# Patient Record
Sex: FEMALE | Race: WHITE
[De-identification: names, ages, dates, MRNs, and addresses within clinical notes are randomized per-mention and may not be internally consistent; named-entity substitution may affect disease eponyms.]

---

## 2019-03-03 NOTE — ED COUGH/URI
General


Chief Complaint:  Respiratory Problems


Stated Complaint:  COUGH-HARD TO BREATHE


Nursing Triage Note:  


COUGH X3 DAYS STARTING TO WHEEZE LAST NIGHT.


Sepsis Screen:  No Definite Risk





History of Present Illness


Date Seen by Provider:  Mar 3, 2019


Time Seen by Provider:  00:59


Initial Comments


Patient is a 19-year-old female who presents to the emergency department today 

complaining of loss of voice and a cough. She has been ill over the last 2-3 

days. She complains of a persistent dry hacking cough. She does have a prior 

history of asthma and has been using her inhaler but this has not been 

relieving her cough symptoms. No fevers or chills. No upper airway congestion. 

She also lost her voice and complains of hoarseness. No swelling in the back of 

the throat. Mild sore throat but this is not a predominant symptom.





Allergies and Home Medications


Allergies


Coded Allergies:  


     strawberry (Unverified  Allergy, Mild, 3/3/19)


     latex (Verified  Allergy, Unknown, RASH, 3/3/19)





Home Medications


Azithromycin 250 Mg Tablet, 250 MG PO DAILY


   Prescribed by: MADDIE PETER on 3/3/19 0105


Hydrocodone/Acetaminophen 10 Ml Solution, 5 ML PO Q4H PRN for COUGH


   Prescribed by: MADDIE PETER on 3/3/19 0106


Oseltamivir Phosphate 75 Mg Capsule, 1 CAP PO BID


   Prescribed by: ROBER BRAND on 8/25/09 0112


Prednisone 50 Mg Tab, 50 MG PO DAILY


   Prescribed by: MADDIE PETER on 3/3/19 0105





Patient Home Medication List


Home Medication List Reviewed:  Yes





Review of Systems


Review of Systems


Constitutional:  see HPI


EENTM:  see HPI


Respiratory:  see HPI


Cardiovascular:  no symptoms reported


Genitourinary:  no symptoms reported


Musculoskeletal:  no symptoms reported


Skin:  no symptoms reported


Hematologic/Lymphatic:  No Symptoms Reported





All Other Systems Reviewed


Negative Unless Noted:  Yes





Past Medical-Social-Family Hx


Patient Social History


Recent Foreign Travel:  No


Contact w/Someone Who Travel:  No


Recent Infectious Disease Expo:  No


Physical Abuse:  No


Sexual Abuse:  No


Mistreated:  No


Fear:  No





Past Medical History


Pregnant:  No





Physical Exam





Vital Signs - First Documented








 3/3/19





 00:50


 


Temp 98.0


 


Pulse 96


 


Resp 20


 


B/P (MAP) 129/79 (96)


 


Pulse Ox 100


 


O2 Delivery Room Air





Capillary Refill : Less Than 3 Seconds


Height: 5'5.50"


Weight: 350lbs. oz. 158.178140ny;  BMI


Method:Actual


General Appearance:  WD/WN, no apparent distress


HEENT:  PERRL/EOMI, normal ENT inspection, TMs normal, pharynx normal


Neck:  full range of motion, supple


Respiratory:  chest non-tender, lungs clear, normal breath sounds, no 

respiratory distress


Cardiovascular:  regular rate, rhythm


Extremities:  normal range of motion


Neurologic/Psychiatric:  CNs II-XII nml as tested


Skin:  normal color





Progress/Results/Core Measures


Suspected Sepsis


Recent Fever Within 48 Hours:  No


Infection Criteria Present:  None


New/Unexplained  Altered Menta:  No


Sepsis Screen:  No Definite Risk


SIRS


Temperature:98.0 


Pulse: 96 


Respiratory Rate: 20


 


Blood Pressure 129 /79 


Mean: 96





Results/Orders


My Orders





Orders - MADDIE PETER DO


Azithromycin Tablet (Zithromax Tablet) (3/3/19 01:00)


Prednisone Tablet (Deltasone Tablet) (3/3/19 01:00)


Hydrocodone/Apap 5/325 Tablet (Lortab 5 (3/3/19 01:00)





Medications Given in ED





Current Medications








 Medications  Dose


 Ordered  Sig/Veronica


 Route  Start Time


 Stop Time Status Last Admin


Dose Admin


 


 Acetaminophen/


 Hydrocodone Bitart  1 tab  ONCE  ONCE


 PO  3/3/19 01:00


 3/3/19 01:01 DC 3/3/19 01:13


1 TAB


 


 Azithromycin  500 mg  ONCE  ONCE


 PO  3/3/19 01:00


 3/3/19 01:01 DC 3/3/19 01:13


500 MG


 


 Prednisone  50 mg  ONCE  ONCE


 PO  3/3/19 01:00


 3/3/19 01:01 DC 3/3/19 01:13


50 MG








Vital Signs/I&O











 3/3/19





 00:50


 


Temp 98.0


 


Pulse 96


 


Resp 20


 


B/P (MAP) 129/79 (96)


 


Pulse Ox 100


 


O2 Delivery Room Air





Capillary Refill : Less Than 3 Seconds








Blood Pressure Mean:  96








Progress Note :  


   Time:  01:00


Progress Note


Patient was evaluated in the emergency department for a cough. She did have 

very tight sounding cough when she was coughing. She was not producing sputum. 

No fevers or chills. The respiratory examination was negative. She does have a 

history of asthma but her lungs were perfectly clear with good air movement and 

no increased work of breathing. In the ER, patient was given the first dose of 

a Z-Bhavin, prednisone, and one Norco. She was discharged home with some 

hydrocodone elixir to use at home for her cough. She will complete the Z-Bhavin 

over the next 4 days and is placed on prednisone for 4 days as well. She does 

have an albuterol inhaler at home which she states she has plenty of and does 

not need a refill. She was encouraged to follow up with her primary care doctor 

or come back to the ER for any new or worsening symptoms.





Departure


Impression





 Primary Impression:  


 Bronchitis


 Additional Impression:  


 Laryngitis


Disposition:  01 HOME, SELF-CARE


Condition:  Improved





Departure-Patient Inst.


Referrals:  


NO,LOCAL PHYSICIAN (PCP)


Primary Care Physician


Scripts


Hydrocodone/Acetaminophen (Hydrocodone-Acetamin 5-217/10 ML) 10 Ml Solution


5 ML PO Q4H PRN for COUGH, #120 ML


   Prov: MADIDE PETER DO         3/3/19 


Azithromycin (Azithromycin) 250 Mg Tablet


250 MG PO DAILY, #4 TAB 0 Refills


   Prov: MADDIE PETER DO         3/3/19 


Prednisone (Prednisone) 50 Mg Tab


50 MG PO DAILY for 4 Days, #4 TAB


   Prov: MADDIE PETER DO         3/3/19











MADDIE PETER DO Mar 3, 2019 00:59

## 2019-07-18 NOTE — ED GENERAL
General


Chief Complaint:  Abdominal/GI Problems


Stated Complaint:  VOMITING, FATIGUED


Source of Information:  Patient, Family (father)





History of Present Illness


Date Seen by Provider:  Jul 18, 2019


Time Seen by Provider:  23:20


Initial Comments


The patient is a morbidly obese pleasant 19-year-old female who presents for 

evaluation of nausea and vomiting over the last 3 or 4 days. She states that her

last menstrual period was on June 1 making her approximately 17 days late. She 

states there is a chance that she is pregnant. She denies any diarrhea, 

abdominal or back pain, pelvic pain/bleeding/discharge, urinary symptoms, 

hematemesis, rectal bleeding, chest pain or shortness of breath, dizziness or 

syncope. She is alert and oriented 4, calm, appears to be in no distress. She 

has not been pregnant previously.


Timing/Duration:  3-4 Days


Severity:  Mild


Associated Systoms:  Nausea/Vomiting





Allergies and Home Medications


Allergies


Coded Allergies:  


     strawberry (Unverified  Allergy, Mild, 3/3/19)


     latex (Verified  Allergy, Unknown, RASH, 3/3/19)





Home Medications


Azithromycin 250 Mg Tablet, 250 MG PO DAILY


   Prescribed by: MADDIE PETER on 3/3/19 0105


Hydrocodone/Acetaminophen 10 Ml Solution, 5 ML PO Q4H PRN for COUGH


   Prescribed by: MADDIE PETER on 3/3/19 0106


Oseltamivir Phosphate 75 Mg Capsule, 1 CAP PO BID


   Prescribed by: ROBER BRAND on 8/25/09 0112


Prednisone 50 Mg Tab, 50 MG PO DAILY


   Prescribed by: MADDIE PETER on 3/3/19 0105





Patient Home Medication List


Home Medication List Reviewed:  Yes





Review of Systems


Review of Systems


Constitutional:  no symptoms reported


EENTM:  no symptoms reported


Respiratory:  no symptoms reported


Cardiovascular:  no symptoms reported


Gastrointestinal:  nausea, vomiting


Genitourinary:  no symptoms reported


Musculoskeletal:  no symptoms reported


Skin:  no symptoms reported


Psychiatric/Neurological:  No Symptoms Reported


Hematologic/Lymphatic:  No Symptoms Reported


Immunological/Allergic:  no symptoms reported





All Other Systems Reviewed


Negative Unless Noted:  Yes





Past Medical-Social-Family Hx


Past Med/Social Hx:  Reviewed Nursing Past Med/Soc Hx


Patient Social History


2nd Hand Smoke Exposure:  No


Recent Foreign Travel:  No


Contact w/Someone Who Travel:  No


Recent Hopitalizations:  No





Immunizations Up To Date


Tetanus Booster (TDap):  Unknown





Past Medical History


Surgeries:  Yes (WISDOM TEETH REMOVAL)


Gallbladder


Respiratory:  Yes


Asthma


Cardiac:  No


Neurological:  No


Genitourinary:  No


Gastrointestinal:  No


Musculoskeletal:  No


Endocrine:  No


HEENT:  No


Cancer:  No


Psychosocial:  No


Blood Disorders:  No





Physical Exam


Vital Signs





Vital Signs - First Documented








 7/18/19





 23:13


 


Temp 97.6


 


Pulse 88


 


Resp 18


 


B/P (MAP) 142/78 (99)


 


Pulse Ox 99


 


O2 Delivery Room Air





Capillary Refill :


Height, Weight, BMI


Height: 5'5.50"


Weight: 350lbs. oz. 158.299653sr;  BMI


Method:Actual


General Appearance:  No Apparent Distress, WD/WN, Obese


HEENT:  PERRL/EOMI, Pharynx Normal


Neck:  Full Range of Motion, Normal Inspection


Respiratory:  Lungs Clear, Normal Breath Sounds, No Respiratory Distress


Cardiovascular:  Regular Rate, Rhythm, No Murmur, Normal Peripheral Pulses


Gastrointestinal:  Normal Bowel Sounds, No Organomegaly, No Pulsatile Mass, Non 

Tender, Soft


Extremity:  Normal Capillary Refill, Normal Inspection, Normal Range of Motion, 

Non Tender


Neurologic/Psychiatric:  Alert, Oriented x3, No Motor/Sensory Deficits, Normal 

Mood/Affect, CNs II-XII Norm as Tested


Skin:  Normal Color, Warm/Dry





Progress/Results/Core Measures


Suspected Sepsis


SIRS


Temperature: 


Pulse:  


Respiratory Rate: 


 


Blood Pressure  / 


Mean:





Results/Orders


Lab Results





Laboratory Tests








Test


 7/18/19


23:47 Range/Units


 


 


Urine Color YELLOW   


 


Urine Clarity SLT CLOUDY   


 


Urine pH 6.0  5-9  


 


Urine Specific Gravity 1.020  1.016-1.022  


 


Urine Protein NEGATIVE  NEGATIVE  


 


Urine Glucose (UA) NEGATIVE  NEGATIVE  


 


Urine Ketones NEGATIVE  NEGATIVE  


 


Urine Nitrite NEGATIVE  NEGATIVE  


 


Urine Bilirubin NEGATIVE  NEGATIVE  


 


Urine Urobilinogen 1.0  NORMAL  MG/DL


 


Urine Leukocyte Esterase 3+ H NEGATIVE  


 


Urine RBC (Auto) TRACE H NEGATIVE  


 


Urine RBC 0-2   /HPF


 


Urine WBC  H  /HPF


 


Urine Squamous Epithelial


Cells 2-5 


  /HPF





 


Urine Crystals NONE   /LPF


 


Urine Bacteria MODERATE H  /HPF


 


Urine Casts NONE   /LPF


 


Urine Mucus NONE   /LPF


 


Urine Trichomonas FEW H  /HPF


 


Urine Culture Indicated YES   


 


Urine Pregnancy Test NEGATIVE  NEGATIVE  








My Orders





Orders - LINDA PRITCHETT DO


Ua Culture If Indicated (7/18/19 23:14)


Hcg,Qualitative Urine (7/18/19 23:14)


Metoclopramide Tablet (Reglan Tablet) (7/18/19 23:30)


Urine Culture (7/18/19 23:47)





Vital Signs/I&O











 7/18/19





 23:13


 


Temp 97.6


 


Pulse 88


 


Resp 18


 


B/P (MAP) 142/78 (99)


 


Pulse Ox 99


 


O2 Delivery Room Air





Capillary Refill :


Progress Note :  


Progress Note


@0014 - Patient updated on urinalysis results which show an acute UTI as well as

Trichomonas. The patient will be sent home with a prescription for Bactrim and 

will be given 2 g of Flagyl here. Advised the patient to follow up with her PCP 

in the next 2-3 days and to return for new or worsening symptoms. The patient 

expresses verbal understanding and agreement with the plan and is stable for 

discharge.





Departure


Impression





   Primary Impression:  


   Acute UTI


   Additional Impression:  


   Trichomonas infection


Disposition:  01 HOME, SELF-CARE


Condition:  Stable





Departure-Patient Inst.


Referrals:  


CHIDI KUMAR MD (PCP)


Primary Care Physician


Patient Instructions:  Urinary Tract Infections in Adults, Trichomoniasis (DC), 

STD Prevention





Add. Discharge Instructions:  


Take the prescribed medicine as directed. Return to the ER for new or worsening 

symptoms. Follow-up with your doctor in the next 2-3 days.


Scripts


Sulfamethoxazole/Trimethoprim (Bactrim Ds Tablet) 1 Each Tablet


1 EACH PO BID for 3 Days, TAB


   Prov: LINDA PRITCHETT DO         7/19/19











LINDA PRITCHETT DO              Jul 18, 2019 23:37

## 2019-07-19 NOTE — XMS REPORT
Continuity of Care Document

                             Created on: 2019



JASON MCDOWELL

External Reference #: [id number]

: 2000

Sex: Female



Demographics







                          Address                   [address] [place], [place] [address]

[place]  [address]

 

                          Home Phone                [phone] x

 

                          Preferred Language        Unknown

 

                          Marital Status            Unknown

 

                          Taoism Affiliation     Unknown

 

                          Race                      Unknown

 

                          Ethnic Group              Unknown





Author







                          Organization              Unknown

 

                          Address                   Unknown

 

                          Phone                     [phone]



              



Allergies

      





             Active              Description              Code              Type              Severity

                Reaction              Onset              Reported/Identified              Relationship

 to Patient                             Clinical Status        

 

             Yes              latex              [id number]              Drug Allergy              Unknown

              RASH                             2019                                  

    

 

                Yes              strawberry              [id number]              Drug Allergy         

             Mild              N/A                             2019                          

                                                 



                    



Medications

      



There is no data.                  



Problems

      





             Date Dx Coded              Attending              Type              Code              Diagnosis

                                        Diagnosed By        

 

                2019              MADDIE PETER DO              Ot              J04.0        

                          ACUTE LARYNGITIS                       

 

                2019              MADDIE PETER DO L              Ot              J45.909      

                          UNSPECIFIED ASTHMA, UNCOMPLICATED                       

 

                2019              PETER DO MADDIE L              Ot              R05          

                          COUGH                              

 

                2019              MADDIE PETER DO L              Ot              Z79.52       

                          LONG TERM (CURRENT) USE OF SYSTEMIC STER                       

 

                2019              BRITTANI DO MADDIE L              Ot              Z91.040      

                          LATEX ALLERGY STATUS                       

 

                2019              PETER DO MADDIE L              Ot              J04.0        

                          ACUTE LARYNGITIS                       

 

                2019              BRITTANI DO MADDIE L              Ot              J45.909      

                          UNSPECIFIED ASTHMA, UNCOMPLICATED                       

 

                2019              PETER DO MADDIE L              Ot              R05          

                          COUGH                              

 

                2019              PETER DO MADDIE L              Ot              Z79.52       

                          LONG TERM (CURRENT) USE OF SYSTEMIC STER                       

 

                2019              PETER DO MADDIE L              Ot              Z91.040      

                          LATEX ALLERGY STATUS                       

 

                2019              PETER DO MADDIE L              Ot              J04.0        

                          ACUTE LARYNGITIS                       

 

                2019              PETER DO MADDIE L              Ot              J45.909      

                          UNSPECIFIED ASTHMA, UNCOMPLICATED                       

 

                2019              PETER DO MADDIE L              Ot              R05          

                          COUGH                              

 

                2019              PETER DO MADDIE L              Ot              Z79.52       

                          LONG TERM (CURRENT) USE OF SYSTEMIC STER                       

 

                2019              PETER DO MADDIE L              Ot              Z91.040      

                          LATEX ALLERGY STATUS                       

 

                2019              PETER DO MADDIE L              Ot              J04.0        

                          ACUTE LARYNGITIS                       

 

                2019              PETER DO MADDIE L              Ot              J45.909      

                          UNSPECIFIED ASTHMA, UNCOMPLICATED                       

 

                2019              MADDIE PETER DO              Ot              R05          

                          COUGH                              

 

                2019              MADDIE PETER DO              Ot              Z79.52       

                          LONG TERM (CURRENT) USE OF SYSTEMIC STER                       

 

                2019              MADDIE PETER DO              Ot              Z91.040      

                          LATEX ALLERGY STATUS                       



                                                        



Procedures

      



There is no data.                  



Results

      





                    Test                Result              Range        









                                        Urine beta human chorionic gonadotropin (hCG) measurement - 19 23:47      

   









                          Urine beta human chorionic gonadotropin (hCG) measurement              NEGATIVE   

                                        NEGATIVE        









                                        Complete urinalysis with reflex to culture - 19 23:47         









                    Urine color determination              YELLOW               NRG        

 

                    Urine clarity determination              SLT CLOUDY               NRG        

 

                    Urine pH measurement by test strip              6.0                 5-9        

 

                    Specific gravity of urine by test strip              1.020               1.016-1.022

        

 

                          Urine protein assay by test strip, semi-quantitative              NEGATIVE        

                                        NEGATIVE        

 

                    Urine glucose detection by automated test strip              NEGATIVE               

NEGATIVE        

 

                          Erythrocytes detection in urine sediment by light microscopy              TRACE   

                                        NEGATIVE        

 

                    Urine ketones detection by automated test strip              NEGATIVE               

NEGATIVE        

 

                    Urine nitrite detection by test strip              NEGATIVE               NEGATIVE  

      

 

                    Urine total bilirubin detection by test strip              NEGATIVE               NEGATIVE

        

 

                          Urine urobilinogen measurement by automated test strip (mass/volume)              

1.0 mg/dL                               NORMAL        

 

                    Urine leukocyte esterase detection by dipstick              3+                  NEGATIVE

        

 

                                        Automated urine sediment erythrocyte count by microscopy (number/high power field)

                           [HPF]                    NRG        

 

                                        Automated urine sediment leukocyte count by microscopy (number/high power field)

                           [HPF]                    NRG        

 

                          Bacteria detection in urine sediment by light microscopy              MODERATE    

                                        NRG        

 

                                        Squamous epithelial cells detection in urine sediment by light microscopy       

                          2-5                       NRG        

 

                          Crystals detection in urine sediment by light microscopy              NONE        

                                        NRG        

 

                          Casts detection in urine sediment by light microscopy              NONE           

                                        NRG        

 

                          Mucus detection in urine sediment by light microscopy              NONE           

                                        NRG        

 

                    Complete urinalysis with reflex to culture              YES                 NRG       

 

 

                          Urine Trichomonas species detection by light microscopy              FEW          

                                        NRG        



                  



Encounters

      





                ACCT No.              Visit Date/Time              Discharge              Status      

                Pt. Type              Provider              Facility              Loc./Unit      

                                        Complaint        

 

                    [id number]              2019 00:37:00              2019 01:40:00      

                DIS              Outpatient              MADDIE PETER DO [facility]              [facility]              ER FS                     COUGH-HARD TO BREATHE        

 

                [id number]              2019 23:53:00                                          

             Document Registration

## 2019-08-22 ENCOUNTER — HOSPITAL ENCOUNTER (EMERGENCY)
Dept: HOSPITAL 75 - ER FS | Age: 19
Discharge: HOME | End: 2019-08-22
Payer: COMMERCIAL

## 2019-08-22 VITALS — BODY MASS INDEX: 47.09 KG/M2 | WEIGHT: 293 LBS | HEIGHT: 66 IN

## 2019-08-22 VITALS — SYSTOLIC BLOOD PRESSURE: 130 MMHG | DIASTOLIC BLOOD PRESSURE: 87 MMHG

## 2019-08-22 DIAGNOSIS — J45.909: ICD-10-CM

## 2019-08-22 DIAGNOSIS — Z91.040: ICD-10-CM

## 2019-08-22 DIAGNOSIS — S91.114A: Primary | ICD-10-CM

## 2019-08-22 DIAGNOSIS — Z79.52: ICD-10-CM
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
## 2019-08-22 PROCEDURE — 99282 EMERGENCY DEPT VISIT SF MDM: CPT

## 2019-08-22 NOTE — ED LOWER EXTREMITY
General


Chief Complaint:  Laceration


Stated Complaint:  RT TOE LAC


Nursing Triage Note:  


PT DROPPED A MIRROR ON HER RIGHT FOOT.  LACERATION TO THE ANTERIOR SIDE OF HER 


RIGHT 5TH TOE.  BLEEDING CONTROLLED.


Nursing Sepsis Screen:  No Definite Risk





History of Present Illness


Date Seen by Provider:  Aug 22, 2019


Time Seen by Provider:  12:55


Initial Comments


The patient is a pleasant 19-year-old female who presents for evaluation of a 

laceration to the right fifth toe. She states that she was moving a mirror when 

she dropped it and it cut the toe. Upon arrival the bleeding has stopped and the

laceration is on the top medial portion of the toe. It does not gape when she 

walks on it. She has no other injuries or complaints and does not believe that 

she fractured her toe and does not want imaging. She is up-to-date with tetanus 

already. She is alert and oriented 4, calm, appears to be in no distress.


Onset:  just prior to arrival


Pain/Injury Location:  right 5th toe


Method of Injury:  direct blow


Modifying Factors:  Improves With Movement (of toe makes it slightly worse)





Allergies and Home Medications


Allergies


Coded Allergies:  


     strawberry (Unverified  Allergy, Mild, 3/3/19)


     latex (Verified  Allergy, Unknown, RASH, 3/3/19)





Home Medications


Azithromycin 250 Mg Tablet, 250 MG PO DAILY


   Prescribed by: MADDIE PETER on 3/3/19 0105


Hydrocodone/Acetaminophen 10 Ml Solution, 5 ML PO Q4H PRN for COUGH


   Prescribed by: MADDIE PETER on 3/3/19 0106


Oseltamivir Phosphate 75 Mg Capsule, 1 CAP PO BID


   Prescribed by: ROBER BRAND on 8/25/09 0112


Prednisone 50 Mg Tab, 50 MG PO DAILY


   Prescribed by: MADDIE PETER on 3/3/19 0105


Sulfamethoxazole/Trimethoprim 1 Each Tablet, 1 EACH PO BID


   Prescribed by: LINDA PRITCHETT on 7/19/19 0018





Patient Home Medication List


Home Medication List Reviewed:  Yes





Review of Systems


Constitutional:  no symptoms reported


EENTM:  no symptoms reported


Respiratory:  no symptoms reported


Cardiovascular:  no symptoms reported


Gastrointestinal:  no symptoms reported


Genitourinary:  no symptoms reported


Pregnant:  No


Musculoskeletal:  other (laceration to right fifth toe)


Skin:  no symptoms reported


Psychiatric/Neurological:  No Symptoms Reported





All Other Systems Reviewed


Negative Unless Noted:  Yes





Past Medical-Social-Family Hx


Past Med/Social Hx:  Reviewed Nursing Past Med/Soc Hx


Patient Social History


Alcohol Use:  Denies Use


Recreational Drug Use:  No


Type Used:  Cigarettes


2nd Hand Smoke Exposure:  No


Recent Foreign Travel:  No


Contact w/Someone Who Travel:  No


Recent Infectious Disease Expo:  No


Recent Hopitalizations:  No


Physical Abuse:  No


Sexual Abuse:  No


Mistreated:  No


Fear:  No





Immunizations Up To Date


Tetanus Booster (TDap):  Unknown





Past Medical History


Surgeries:  Yes (WISDOM TEETH REMOVAL)


Gallbladder


Respiratory:  Yes


Asthma


Cardiac:  No


Neurological:  No


Genitourinary:  No


Gastrointestinal:  No


Musculoskeletal:  No


Endocrine:  No


HEENT:  No


Cancer:  No


Psychosocial:  No


Blood Disorders:  No





Physical Exam


Vital Signs





Vital Signs - First Documented








 8/22/19





 12:27


 


Temp 97.4


 


Pulse 88


 


Resp 20


 


B/P (MAP) 150/87 (108)


 


Pulse Ox 98


 


O2 Delivery Room Air





Capillary Refill : Less Than 3 Seconds


Height, Weight, BMI


Height: 5'6.00"


Weight: 338lbs. 0oz. 153.942568zj;  BMI


Method:Stated


General Appearance:  WD/WN, no apparent distress, obese


HEENT:  PERRL/EOMI, normal ENT inspection


Neck:  full range of motion, normal inspection


Cardiovascular:  regular rate, rhythm, no edema, no JVD


Respiratory:  normal breath sounds, no respiratory distress


Feet:  right foot abrasions/lacerations (1.5 cm laceration to right fifth toe on

the top/medial portion, no dehiscence with separation of fourth and fifth toes, 

no dehiscence with weightbearing, no active bleeding)


Neurologic/Psychiatric:  CNs II-XII nml as tested, no motor/sensory deficits, 

alert, normal mood/affect, oriented x 3


Skin:  normal color, warm/dry


Lymphatic:  no adenopathy





Procedures/Interventions





   Wound Location:  Lower Extremities (right 5th toe)


Other Wound Location


right 5th toe on top/medial aspect


   Wound Length (cm):  1.5


   Wound's Depth, Shape:  superficial


   Wound Explored:  clean


   Irrigated w/ Saline (ccs):  500


   Wound Debrided:  none


   Other Closure Supply:  Steri Strip 1/4", Wound Adhesive


Progress


Patient tolerated the repair with skin adhesive and Steri-Strips very well





Progress/Results/Core Measures


Results/Orders


Vital Signs/I&O











 8/22/19





 12:27


 


Temp 97.4


 


Pulse 88


 


Resp 20


 


B/P (MAP) 150/87 (108)


 


Pulse Ox 98


 


O2 Delivery Room Air














Blood Pressure Mean:                    108











Departure


Impression





   Primary Impression:  


   Laceration of toe of right foot


Disposition:  01 HOME, SELF-CARE


Condition:  Stable





Departure-Patient Inst.


Decision time for Depature:  13:35


Referrals:  


CHIDI KUMAR MD (PCP/Family)


Primary Care Physician


Patient Instructions:  Laceration Repair With Glue (DC)





Add. Discharge Instructions:  


Follow-up with your doctor in the next 2-3 days for wound check. Return to the 

ER for new or worsening symptoms.











LINDA PRITCHETT DO              Aug 22, 2019 13:16

## 2019-08-26 ENCOUNTER — HOSPITAL ENCOUNTER (EMERGENCY)
Dept: HOSPITAL 75 - ER FS | Age: 19
LOS: 1 days | Discharge: HOME | End: 2019-08-27
Payer: COMMERCIAL

## 2019-08-26 VITALS — BODY MASS INDEX: 48.23 KG/M2 | WEIGHT: 293 LBS | HEIGHT: 65.5 IN

## 2019-08-26 DIAGNOSIS — S39.012A: Primary | ICD-10-CM

## 2019-08-26 DIAGNOSIS — Z91.040: ICD-10-CM

## 2019-08-26 DIAGNOSIS — Z91.018: ICD-10-CM
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
## 2019-08-26 DIAGNOSIS — Z79.52: ICD-10-CM

## 2019-08-26 DIAGNOSIS — J45.909: ICD-10-CM

## 2019-08-26 DIAGNOSIS — S29.019A: ICD-10-CM

## 2019-08-26 PROCEDURE — 84703 CHORIONIC GONADOTROPIN ASSAY: CPT

## 2019-08-26 PROCEDURE — 81000 URINALYSIS NONAUTO W/SCOPE: CPT

## 2019-08-26 PROCEDURE — 72128 CT CHEST SPINE W/O DYE: CPT

## 2019-08-26 PROCEDURE — 72131 CT LUMBAR SPINE W/O DYE: CPT

## 2019-08-27 VITALS — DIASTOLIC BLOOD PRESSURE: 72 MMHG | SYSTOLIC BLOOD PRESSURE: 117 MMHG

## 2019-08-27 LAB
APTT PPP: YELLOW S
BACTERIA #/AREA URNS HPF: (no result) /HPF
BILIRUB UR QL STRIP: NEGATIVE
FIBRINOGEN PPP-MCNC: (no result) MG/DL
GLUCOSE UR STRIP-MCNC: NEGATIVE MG/DL
HCG UR QL: NEGATIVE
KETONES UR QL STRIP: (no result)
LEUKOCYTE ESTERASE UR QL STRIP: NEGATIVE
NITRITE UR QL STRIP: NEGATIVE
PH UR STRIP: 5.5 [PH] (ref 5–9)
PROT UR QL STRIP: NEGATIVE
RBC #/AREA URNS HPF: (no result) /HPF
SP GR UR STRIP: >=1.03 (ref 1.02–1.02)
SQUAMOUS #/AREA URNS HPF: (no result) /HPF
UROBILINOGEN UR-MCNC: 0.2 MG/DL
WBC #/AREA URNS HPF: (no result) /HPF

## 2019-08-27 NOTE — ED BACK PAIN
General


Chief Complaint:  Back Problems


Stated Complaint:  BACK PAIN


Nursing Triage Note:  


PT. WAS MOVING FURNITURE AND NOW HER BACK HURTS.  PT. STATED  SHE  HAS A  HX OF 


BACK PAIN. NO NUMBNESS OR TINGLING.


Nursing Sepsis Screen:  No Definite Risk


Source of Information:  Patient





History of Present Illness


Date Seen by Provider:  Aug 26, 2019


Time Seen by Provider:  23:57


Initial Comments


19-year-old female presenting with complaints of back pain. She has a history of

back pain for the last several years. She cannot remember what originally 

started her back pain. She states that she was moving a dresser this weekend and

has had increased back pain since Sunday. She has pain radiating down her back 

into her legs. She denies any numbness or tingling. She has no loss of bowel or 

bladder control. She has tried acetaminophen and ibuprofen with little to no 

relief. She follows with Dr. Kumar in the clinic and has seen him about her 

back pain in the past but has not gone in about this new back pain since it just

started Sunday. She had more pain tonight and was supposed to go to work but 

could not due to the pain so she came to the ED





Allergies and Home Medications


Allergies


Coded Allergies:  


     strawberry (Unverified  Allergy, Mild, 3/3/19)


     latex (Verified  Allergy, Unknown, RASH, 3/3/19)





Home Medications


Azithromycin 250 Mg Tablet, 250 MG PO DAILY


   Prescribed by: MADDIE PETER on 3/3/19 0105


Baclofen 10 Mg Tablet, 10 MG PO TID PRN for MUSCLE SPASMS


   Prescribed by: ISABEL AG on 8/27/19 0207


Hydrocodone Bit/Acetaminophen 1 Tab Tab, 1 EACH PO Q6H PRN for PAIN-MODERATE


   Prescribed by: ISABEL AG on 8/27/19 0207


Hydrocodone/Acetaminophen 10 Ml Solution, 5 ML PO Q4H PRN for COUGH


   Prescribed by: MADDIE PETER on 3/3/19 0106


Ibuprofen 800 Mg Tablet, 800 MG PO Q8H PRN for PAIN


   Prescribed by: ISABEL AG on 8/27/19 0207


Oseltamivir Phosphate 75 Mg Capsule, 1 CAP PO BID


   Prescribed by: ROBER BRAND on 8/25/09 0112


Prednisone 50 Mg Tab, 50 MG PO DAILY


   Prescribed by: MADDIE PETER on 3/3/19 0105


Sulfamethoxazole/Trimethoprim 1 Each Tablet, 1 EACH PO BID


   Prescribed by: LINDA PRITCHETT on 7/19/19 0018





Patient Home Medication List


Home Medication List Reviewed:  Yes





Review of Systems


Constitutional:  No chills, No fever, No malaise


EENTM:  no symptoms reported


Respiratory:  no symptoms reported


Cardiovascular:  no symptoms reported


Gastrointestinal:  no symptoms reported


Genitourinary:  other (LMP was in June but has had negative pregnancy test at 

home.)


Musculoskeletal:  see HPI, back pain; No muscle weakness


Skin:  no symptoms reported


Psychiatric/Neurological:  Denies Numbness, Denies Paresthesia





Past Medical-Social-Family Hx


Past Med/Social Hx:  Reviewed Nursing Past Med/Soc Hx


Patient Social History


Type Used:  Cigarettes


2nd Hand Smoke Exposure:  No


Recent Foreign Travel:  No


Contact w/Someone Who Travel:  No


Recent Infectious Disease Expo:  No


Recent Hopitalizations:  No





Immunizations Up To Date


Tetanus Booster (TDap):  Unknown





Past Medical History


Surgeries:  Yes (WISDOM TEETH REMOVAL)


Gallbladder


Respiratory:  Yes


Asthma


Cardiac:  No


Neurological:  No


Genitourinary:  No


Gastrointestinal:  No


Musculoskeletal:  No


Endocrine:  No


HEENT:  No


Cancer:  No


Psychosocial:  No


Blood Disorders:  No





Physical Exam


Vital Signs





Vital Signs - First Documented








 8/27/19





 00:07


 


Temp 97.9


 


Pulse 90


 


Resp 16


 


B/P (MAP) 117/72 (87)


 


Pulse Ox 100


 


O2 Delivery Room Air





Capillary Refill : Less Than 3 Seconds


Height, Weight, BMI


Height: 5'5.50"


Weight: 334lbs. 0oz. 151.649398vz;  BMI


Method:Stated


General Appearance:  No Apparent Distress, WD/WN


HEENT:  PERRL/EOMI, Normal ENT Inspection, Pharynx Normal


Neck:  Normal Inspection, Non Tender, Supple


Cardiovascular:  Regular Rate, Rhythm, Normal Peripheral Pulses


Respiratory:  Chest Non Tender, Lungs Clear, Normal Breath Sounds, No Accessory 

Muscle Use, No Respiratory Distress


Back:  Decreased Range of Motion, Muscle Spasm, Vertebral Tenderness (lower 

thoracic and lumbar spine), Other (pain with SLR bilaterally worse on right. 

Pain starts at 15 degrees)


Extremity:  Normal Capillary Refill, Normal Inspection, Normal Range of Motion, 

No Calf Tenderness, No Pedal Edema


Neurologic/Psychiatric:  Alert, Oriented x3, No Motor/Sensory Deficits, CNs II-

XII Norm as Tested; No Abnormal Gait


Skin:  Normal Color, Warm/Dry





Progress/Results/Core Measures


Results/Orders


My Orders





Orders - ISABEL AG MD


Ua Culture If Indicated (8/27/19 00:01)


Hcg,Qualitative Urine (8/27/19 00:01)


Orphenadrine Injection (Norflex Injectio (8/27/19 00:12)


Ketorolac Injection (Toradol Injection) (8/27/19 00:12)


Ct Thoracic/Lumbar Spine Wo (8/27/19 00:12)





Vital Signs/I&O











 8/27/19 8/27/19





 00:07 02:09


 


Temp 97.9 97.9


 


Pulse 90 90


 


Resp 16 16


 


B/P (MAP) 117/72 (87) 117/72 (87)


 


Pulse Ox 100 100


 


O2 Delivery Room Air Room Air














Blood Pressure Mean:                    87











Progress


Progress Note #1:  


Progress Note


Try toradol and norflex for pain. Check CT Thoracic and Lumbar spine. Check UA 

with Urine pregnancy test.


Progress Note #2:  


Progress Note


CT does not show any acute fracture or dislocation for spine. Treat s

ymptomatically and have her check with Dr. Kumar for continued 

concerns/problems


Counseled that the Thyroid was prominent on CT and showed some lymph nodes as 

well. Encouraged to get ultrasound or testing with pcp





Diagnostic Imaging





   Diagonstic Imaging:  CT


   Plain Films/CT/US/NM/MRI:  other (Thoracic and Lumbar spine)


Comments


Impression 1. Images through this. This didn't demonstrate multiple lymph nodes 

beneath the thyroid gland. The thyroid gland is mildly prominent. Nonemergent 

thyroid ultrasound could be performed for further evaluation. Impression of the 

lumbar spine 1. Somewhat limited due to body habitus but unremarkable appearing 

Lumbar spine.


Read by Dr. Linda Peterson MD at 0133 AM and fax at 0143 AM.


   Reviewed:  Reviewed Night [facility] Study





Departure


Impression





   Primary Impression:  


   Strain of thoracic region


   Qualified Codes:  S29.019A - Strain of muscle and tendon of unspecified wall 

   of thorax, initial encounter


   Additional Impression:  


   Acute lumbar myofascial strain


   Qualified Codes:  S39.012A - Strain of muscle, fascia and tendon of lower 

   back, initial encounter


Disposition:  01 HOME, SELF-CARE


Condition:  Stable





Departure-Patient Inst.


Decision time for Depature:  01:57


Referrals:  


CHIDI KUMAR MD (PCP/Family)


Primary Care Physician


Patient Instructions:  Lumbar Muscle Strain (DC), Low Back Pain in Adults, Upper

Back Pain (DC), Radiculopathy (DC)





Add. Discharge Instructions:  


Try alternating ice and heat to your back to help with pain and inflammation. 





Use the muscle relaxers and anti-inflammatories to help with your back pain.





Check with clinic for continued problems/concerns.





The radiologist noted that your thyroid seemed to be slightly prominent on your 

CT scan so they recommended a possible Thyroid ultrasound with your regular 

provider to evaluate this further. You can call Dr. Kumar about this and have 

him see if that is something he wants to schedule or how he would like to manage

that. 





All discharge instructions reviewed with patient and/or family. Voiced 

understanding.


Scripts


Hydrocodone Bit/Acetaminophen (Hydrocodone/Acetaminophen 5/325mg Tablet) 1 Tab 

Tab


1 EACH PO Q6H PRN for PAIN-MODERATE MDD 10 for 3 Days, #12 TAB 0 Refills


   Prov: ISABEL AG MD         8/27/19 


Ibuprofen (Ibuprofen) 800 Mg Tablet


800 MG PO Q8H PRN for PAIN for 10 Days, #30 TAB 0 Refills


   Prov: ISABEL AG MD         8/27/19 


Baclofen (Baclofen) 10 Mg Tablet


10 MG PO TID PRN for MUSCLE SPASMS for 10 Days, #30 TAB 0 Refills


   Prov: ISABEL AG MD         8/27/19


Work/School Note:  Work Release Form   Date Seen in the Emergency Department:  

Aug 27, 2019


   Return to Work:  Aug 29, 2019


   Restrictions:  No Restrictions











ISABEL AG MD               Aug 27, 2019 00:21

## 2019-08-27 NOTE — DIAGNOSTIC IMAGING REPORT
PROCEDURE: CT thoracic and lumbar spine without contrast.



TECHNIQUE: Multiple contiguous axial images were obtained through

the thoracic and lumbar spine without the use of intravenous

contrast. Sagittal and coronal reformations were then performed.



INDICATION: Back pain.



FINDINGS: No comparison available.



The alignment of the thoracic spine is normal. Vertebral body

heights are normal. Disc spaces are normal. The alignment of the

lumbar spine is also normal. Vertebral body heights are normal.

Disc spaces are normal. The facet joints in the thoracolumbar

spine are normal. No acute fracture is seen in the thoracic or

lumbar spine. Thyroid gland is mildly enlarged. No soft tissue

abnormality is seen otherwise. Gallbladder has been removed. No

osseous spinal canal stenosis is seen.



IMPRESSION:

1. Normal thoracic and lumbar spine without acute fracture.



Dictated by: 



  Dictated on workstation # [id number]

## 2019-12-07 ENCOUNTER — HOSPITAL ENCOUNTER (EMERGENCY)
Dept: HOSPITAL 75 - ER FS | Age: 19
Discharge: HOME | End: 2019-12-07
Payer: COMMERCIAL

## 2019-12-07 VITALS — BODY MASS INDEX: 46.53 KG/M2 | HEIGHT: 66.34 IN | WEIGHT: 293 LBS

## 2019-12-07 VITALS — SYSTOLIC BLOOD PRESSURE: 109 MMHG | DIASTOLIC BLOOD PRESSURE: 66 MMHG

## 2019-12-07 DIAGNOSIS — Z79.52: ICD-10-CM

## 2019-12-07 DIAGNOSIS — J06.9: Primary | ICD-10-CM

## 2019-12-07 DIAGNOSIS — J45.909: ICD-10-CM

## 2019-12-07 DIAGNOSIS — Z91.040: ICD-10-CM

## 2019-12-07 PROCEDURE — 87804 INFLUENZA ASSAY W/OPTIC: CPT

## 2019-12-07 NOTE — ED COUGH/URI
General


Chief Complaint:  Cough/Cold/Flu Symptoms


Stated Complaint:  CHEST TIGHTNESS





History of Present Illness


Date Seen by Provider:  Dec 7, 2019


Time Seen by Provider:  15:07


Initial Comments


Morbidly obese 19-year-old female


felt fine yesterday   today she noted  head congestion some discomfort in  left 

ear 


little feeling of tightness in chest   throat drainage   some diarrhea   no 

known exposure to illness


she reports hx asthma but is not wheezing or with any apparent difficulty 

breathing





Allergies and Home Medications


Allergies


Coded Allergies:  


     strawberry (Unverified  Allergy, Mild, 3/3/19)


     latex (Verified  Allergy, Unknown, RASH, 3/3/19)





Home Medications


Azithromycin 250 Mg Tablet, 250 MG PO DAILY


   Prescribed by: MADDIE PETER on 3/3/19 0105


Baclofen 10 Mg Tablet, 10 MG PO TID PRN for MUSCLE SPASMS


   Prescribed by: ISABEL AG on 8/27/19 0207


Hydrocodone Bit/Acetaminophen 1 Tab Tab, 1 EACH PO Q6H PRN for PAIN-MODERATE


   Prescribed by: ISABEL AG on 8/27/19 0207


Hydrocodone/Acetaminophen 10 Ml Solution, 5 ML PO Q4H PRN for COUGH


   Prescribed by: MADDIE PETER on 3/3/19 0106


Ibuprofen 800 Mg Tablet, 800 MG PO Q8H PRN for PAIN


   Prescribed by: ISABEL AG on 8/27/19 0207


Oseltamivir Phosphate 75 Mg Capsule, 1 CAP PO BID


   Prescribed by: ROBER BRAND on 8/25/09 0112


Prednisone 50 Mg Tab, 50 MG PO DAILY


   Prescribed by: MADDIE PETER on 3/3/19 0105


Sulfamethoxazole/Trimethoprim 1 Each Tablet, 1 EACH PO BID


   Prescribed by: LINDA PRITCHETT on 7/19/19 0018





Patient Home Medication List


Home Medication List Reviewed:  Yes





Review of Systems


Review of Systems


Constitutional:  No fever


EENTM:  ear pain, nose congestion


Respiratory:  No cough, No wheezing


Cardiovascular:  no symptoms reported


Gastrointestinal:  diarrhea


Genitourinary:  no symptoms reported





Past Medical-Social-Family Hx


Patient Social History


Type Used:  Cigarettes


2nd Hand Smoke Exposure:  No


Recent Hopitalizations:  No





Immunizations Up To Date


Tetanus Booster (TDap):  Unknown





Past Medical History


Surgeries:  Yes (WISDOM TEETH REMOVAL)


Gallbladder


Respiratory:  Yes


Asthma


Cardiac:  No


Neurological:  No


Genitourinary:  No


Gastrointestinal:  No


Musculoskeletal:  No


Endocrine:  No


HEENT:  No


Cancer:  No


Psychosocial:  No


Blood Disorders:  No





Physical Exam





Vital Signs - First Documented








 12/7/19





 14:10


 


O2 Delivery Room Air





Capillary Refill :


Height: 5'5.50"


Weight: 334lbs. 0oz. 151.796211sz;  BMI


Method:Stated


General Appearance:  no apparent distress


Eyes:  Bilateral Eye PERRL, Bilateral Eye EOMI


HEENT:  TMs normal, pharynx normal


Neck:  supple


Respiratory:  lungs clear, normal breath sounds, no respiratory distress, no 

accessory muscle use


Cardiovascular:  regular rate, rhythm


Gastrointestinal:  normal bowel sounds, non tender, soft





Progress/Results/Core Measures


Suspected Sepsis


SIRS


Temperature: 


Pulse:  


Respiratory Rate: 


 


Blood Pressure  / 


Mean:





Results/Orders


Micro Results





Microbiology


12/7/19 Influenza Types A,B Antigen ([facility]) - Final, Complete


          





My Orders





Orders - JONEL ZAMORA MD


Influenza A And B Antigens (12/7/19 14:47)





Vital Signs/I&O











 12/7/19





 14:10


 


O2 Delivery Room Air





Capillary Refill :


Progress Note :  


Progress Note


influenza A and B neg





Departure


Impression





   Primary Impression:  


   Upper respiratory infection


   Qualified Codes:  J06.9 - Acute upper respiratory infection, unspecified


Disposition:  01 HOME, SELF-CARE


Condition:  Stable





Departure-Patient Inst.


Decision time for Depature:  15:49


Referrals:  


CHIDI KUMAR MD (PCP/Family)


Primary Care Physician


Patient Instructions:  Viral Upper Respiratory Infection, Adult (DC)


Scripts


Azithromycin (Azithromycin) 250 Mg Tablet


250 MG PO UD, #6 TAB


   TAKE 2 TABLETS ON DAY ONE THEN TAKE 1 TABLET DAILY FOR FOUR MORE DAYS


   Prov: JONEL ZAMORA MD         12/7/19











JONEL ZAMORA MD               Dec 7, 2019 15:11


POS

## 2019-12-31 ENCOUNTER — HOSPITAL ENCOUNTER (EMERGENCY)
Dept: HOSPITAL 75 - ER FS | Age: 19
Discharge: HOME | End: 2019-12-31
Payer: COMMERCIAL

## 2019-12-31 VITALS — SYSTOLIC BLOOD PRESSURE: 149 MMHG | DIASTOLIC BLOOD PRESSURE: 79 MMHG

## 2019-12-31 VITALS — HEIGHT: 66.02 IN | BODY MASS INDEX: 47.09 KG/M2 | WEIGHT: 293 LBS

## 2019-12-31 DIAGNOSIS — Z90.89: ICD-10-CM

## 2019-12-31 DIAGNOSIS — F17.210: ICD-10-CM

## 2019-12-31 DIAGNOSIS — A08.4: Primary | ICD-10-CM

## 2019-12-31 DIAGNOSIS — Z91.040: ICD-10-CM

## 2019-12-31 DIAGNOSIS — Z79.52: ICD-10-CM

## 2019-12-31 DIAGNOSIS — J45.909: ICD-10-CM

## 2019-12-31 LAB
ALBUMIN SERPL-MCNC: 4.4 GM/DL (ref 3.2–4.5)
ALP SERPL-CCNC: 54 U/L (ref 40–136)
ALT SERPL-CCNC: 13 U/L (ref 0–55)
APTT PPP: YELLOW S
BACTERIA #/AREA URNS HPF: (no result) /HPF
BILIRUB SERPL-MCNC: 0.7 MG/DL (ref 0.1–1)
BILIRUB UR QL STRIP: NEGATIVE
BUN/CREAT SERPL: 15
CALCIUM SERPL-MCNC: 9.5 MG/DL (ref 8.5–10.1)
CHLORIDE SERPL-SCNC: 106 MMOL/L (ref 98–107)
CO2 SERPL-SCNC: 23 MMOL/L (ref 21–32)
CREAT SERPL-MCNC: 0.73 MG/DL (ref 0.6–1.3)
ERYTHROCYTE [DISTWIDTH] IN BLOOD BY AUTOMATED COUNT: 13 % (ref 10–14.5)
FIBRINOGEN PPP-MCNC: CLEAR MG/DL
GFR SERPLBLD BASED ON 1.73 SQ M-ARVRAT: > 60 ML/MIN
GLUCOSE SERPL-MCNC: 98 MG/DL (ref 70–105)
GLUCOSE UR STRIP-MCNC: NEGATIVE MG/DL
HCT VFR BLD CALC: 41 % (ref 35–52)
HGB BLD-MCNC: 13.6 G/DL (ref 11.5–16)
KETONES UR QL STRIP: NEGATIVE
LEUKOCYTE ESTERASE UR QL STRIP: NEGATIVE
LIPASE SERPL-CCNC: 30 U/L (ref 8–78)
MCH RBC QN AUTO: 30 PG (ref 25–34)
MCHC RBC AUTO-ENTMCNC: 33 G/DL (ref 32–36)
MCV RBC AUTO: 90 FL (ref 80–99)
NITRITE UR QL STRIP: NEGATIVE
PH UR STRIP: 6 [PH] (ref 5–9)
PLATELET # BLD: 305 10^3/UL (ref 130–400)
PMV BLD AUTO: 8.8 FL (ref 7.4–10.4)
POTASSIUM SERPL-SCNC: 4 MMOL/L (ref 3.6–5)
PROT SERPL-MCNC: 7.5 GM/DL (ref 6.4–8.2)
PROT UR QL STRIP: NEGATIVE
RBC #/AREA URNS HPF: (no result) /HPF
SODIUM SERPL-SCNC: 141 MMOL/L (ref 135–145)
SP GR UR STRIP: 1.02 (ref 1.02–1.02)
WBC # BLD AUTO: 11.2 10^3/UL (ref 4.3–11)
WBC #/AREA URNS HPF: (no result) /HPF

## 2019-12-31 PROCEDURE — 80053 COMPREHEN METABOLIC PANEL: CPT

## 2019-12-31 PROCEDURE — 84703 CHORIONIC GONADOTROPIN ASSAY: CPT

## 2019-12-31 PROCEDURE — 85027 COMPLETE CBC AUTOMATED: CPT

## 2019-12-31 PROCEDURE — 83690 ASSAY OF LIPASE: CPT

## 2019-12-31 PROCEDURE — 36415 COLL VENOUS BLD VENIPUNCTURE: CPT

## 2019-12-31 PROCEDURE — 81000 URINALYSIS NONAUTO W/SCOPE: CPT

## 2019-12-31 PROCEDURE — 74019 RADEX ABDOMEN 2 VIEWS: CPT

## 2019-12-31 NOTE — ED GI
General


Chief Complaint:  Abdominal/GI Problems


Stated Complaint:  VOMITING





History of Present Illness


Date Seen by Provider:  Dec 31, 2019


Time Seen by Provider:  00:16


Initial Comments


19-year-old female presents with nausea, vomiting and diarrhea that started 

today. Reports she's had multiple episodes of both. She comes in because she 

continues to have it has gotten a little bit more frequent. She has no abdominal

pain, fever, cough. She reports she has a sore throat from vomiting. No other 

symptoms reported.





Allergies and Home Medications


Allergies


Coded Allergies:  


     strawberry (Unverified  Allergy, Mild, 3/3/19)


     latex (Verified  Allergy, Unknown, RASH, 3/3/19)





Home Medications


Azithromycin 250 Mg Tablet, 250 MG PO DAILY


   Prescribed by: MADDIE PETER on 3/3/19 0105


Azithromycin 250 Mg Tablet, 250 MG PO UD


   TAKE 2 TABLETS ON DAY ONE THEN TAKE 1 TABLET DAILY FOR FOUR MORE DAYS 


   Prescribed by: JONEL ZAMORA on 12/7/19 1550


Baclofen 10 Mg Tablet, 10 MG PO TID PRN for MUSCLE SPASMS


   Prescribed by: ISABEL AG on 8/27/19 0207


Hydrocodone Bit/Acetaminophen 1 Tab Tab, 1 EACH PO Q6H PRN for PAIN-MODERATE


   Prescribed by: ISABEL BETHEART on 8/27/19 0207


Hydrocodone/Acetaminophen 10 Ml Solution, 5 ML PO Q4H PRN for COUGH


   Prescribed by: MADDIE PETER on 3/3/19 0106


Ibuprofen 800 Mg Tablet, 800 MG PO Q8H PRN for PAIN


   Prescribed by: ISABEL WHEELERYART on 8/27/19 0207


Oseltamivir Phosphate 75 Mg Capsule, 1 CAP PO BID


   Prescribed by: ROBER BRAND on 8/25/09 0112


Prednisone 50 Mg Tab, 50 MG PO DAILY


   Prescribed by: MADDIE PETER on 3/3/19 0105


Sulfamethoxazole/Trimethoprim 1 Each Tablet, 1 EACH PO BID


   Prescribed by: LINDA PRITCHETT on 7/19/19 0018





Patient Home Medication List


Home Medication List Reviewed:  Yes





Review of Systems


Review of Systems


Constitutional:  No chills, No fever


Respiratory:  Denies Cough


Cardiovascular:  Denies Chest Pain


Gastrointestinal:  Denies Abdominal Pain; Diarrhea, Nausea, Vomiting


Genitourinary:  No Symptoms Reported


Musculoskeletal:  no symptoms reported


Skin:  no symptoms reported


Psychiatric/Neurological:  No Symptoms Reported





Past Medical-Social-Family Hx


Past Med/Social Hx:  Reviewed Nursing Past Med/Soc Hx


Patient Social History


Alcohol Use:  Regular Use


Recreational Drug Use:  No


Smoking Status:  Current Everyday Smoker


Type Used:  Cigarettes


2nd Hand Smoke Exposure:  No


Recent Foreign Travel:  No


Contact w/Someone Who Travel:  No


Recent Hopitalizations:  No


Physical Abuse:  No


Sexual Abuse:  No


Mistreated:  No


Fear:  No





Immunizations Up To Date


Tetanus Booster (TDap):  Unknown





Seasonal Allergies


Seasonal Allergies:  No





Past Medical History


Surgeries:  Yes (WISDOM TEETH REMOVAL)


Gallbladder, Tonsillectomy


Respiratory:  Yes


Asthma


Cardiac:  No


Neurological:  No


Genitourinary:  No


Gastrointestinal:  No


Musculoskeletal:  No


Endocrine:  No


HEENT:  No


Cancer:  No


Psychosocial:  No


Integumentary:  No


Blood Disorders:  No





Physical Exam


Vital Signs





Vital Signs - First Documented








 12/31/19





 00:12


 


Temp 36.8


 


Pulse 98


 


Resp 16


 


B/P (MAP) 145/89 (107)


 


O2 Delivery Room Air





Capillary Refill :


Height/Weight/BMI


Height: 5'5.50"


Weight: 334lbs. 0oz. 151.990689kt; 58.00 BMI


Method:Stated


General Appearance:  WD/WN, no apparent distress, obese (morbid )


HEENT:  PERRL/EOMI


Respiratory:  lungs clear, normal breath sounds, no respiratory distress


Cardiovascular:  regular rate, rhythm, no edema


Gastrointestinal:  non tender, soft


Back:  no vertebral tenderness


Neurologic/Psychiatric:  no motor/sensory deficits, alert, oriented x 3


Skin:  normal color, warm/dry





Progress/Results/Core Measures


Results/Orders


Lab Results





Laboratory Tests








Test


 12/31/19


00:31 Range/Units


 


 


White Blood Count


 11.2 H


 4.3-11.0


10^3/uL


 


Red Blood Count


 4.52 


 4.35-5.85


10^6/uL


 


Hemoglobin 13.6  11.5-16.0  G/DL


 


Hematocrit 41  35-52  %


 


Mean Corpuscular Volume 90  80-99  FL


 


Mean Corpuscular Hemoglobin 30  25-34  PG


 


Mean Corpuscular Hemoglobin


Concent 33 


 32-36  G/DL





 


Red Cell Distribution Width 13.0  10.0-14.5  %


 


Platelet Count


 305 


 130-400


10^3/uL


 


Mean Platelet Volume 8.8  7.4-10.4  FL


 


Urine Color YELLOW   


 


Urine Clarity CLEAR   


 


Urine pH 6.0  5-9  


 


Urine Specific Gravity 1.025 H 1.016-1.022  


 


Urine Protein NEGATIVE  NEGATIVE  


 


Urine Glucose (UA) NEGATIVE  NEGATIVE  


 


Urine Ketones NEGATIVE  NEGATIVE  


 


Urine Nitrite NEGATIVE  NEGATIVE  


 


Urine Bilirubin NEGATIVE  NEGATIVE  


 


Urine Urobilinogen 0.2  < = 1.0  MG/DL


 


Urine Leukocyte Esterase NEGATIVE  NEGATIVE  


 


Urine RBC (Auto) NEGATIVE  NEGATIVE  


 


Urine RBC NONE   /HPF


 


Urine WBC RARE   /HPF


 


Urine Squamous Epithelial


Cells 10-25 H


  /HPF





 


Urine Crystals NONE   /LPF


 


Urine Bacteria TRACE   /HPF


 


Urine Casts NONE   /LPF


 


Urine Mucus NEGATIVE   /LPF


 


Urine Culture Indicated NO   


 


Sodium Level 141  135-145  MMOL/L


 


Potassium Level 4.0  3.6-5.0  MMOL/L


 


Chloride Level 106    MMOL/L


 


Carbon Dioxide Level 23  21-32  MMOL/L


 


Anion Gap 12  5-14  MMOL/L


 


Blood Urea Nitrogen 11  7-18  MG/DL


 


Creatinine


 0.73 


 0.60-1.30


MG/DL


 


Estimat Glomerular Filtration


Rate > 60 


  





 


BUN/Creatinine Ratio 15   


 


Glucose Level 98    MG/DL


 


Calcium Level 9.5  8.5-10.1  MG/DL


 


Corrected Calcium 9.2  8.5-10.1  MG/DL


 


Total Bilirubin 0.7  0.1-1.0  MG/DL


 


Aspartate Amino Transf


(AST/SGOT) 14 


 5-34  U/L





 


Alanine Aminotransferase


(ALT/SGPT) 13 


 0-55  U/L





 


Alkaline Phosphatase 54    U/L


 


Total Protein 7.5  6.4-8.2  GM/DL


 


Albumin 4.4  3.2-4.5  GM/DL


 


Lipase 30  8-78  U/L








My Orders





Orders - DAMON,MARIA E L DO


Cbc No Diff (12/31/19 00:22)


Comprehensive Metabolic Panel (12/31/19 00:22)


Lipase (12/31/19 00:22)


Abdomen Flat & Upright/Decub (12/31/19 00:22)


Ondansetron  Oral Dissolve Tab (Zofran (12/31/19 00:22)


Ua Culture If Indicated (12/31/19 00:24)


Urine Pregnancy Bedside (12/31/19 00:47)





Vital Signs/I&O











 12/31/19





 00:12


 


Temp 36.8


 


Pulse 98


 


Resp 16


 


B/P (MAP) 145/89 (107)


 


O2 Delivery Room Air











Departure


Impression





   Primary Impression:  


   Viral gastroenteritis


Disposition:  01 HOME, SELF-CARE


Condition:  Stable





Departure-Patient Inst.


Referrals:  


CHIDI KUMAR MD (PCP/Family)


Primary Care Physician


Patient Instructions:  Viral Gastroenteritis





Add. Discharge Instructions:  


Emergency department focuses on treating and ruling out life-threatening 

diseases. Whenever possible, a diagnosis is given. However, most patients are 

given an impression based on their  history, physical exam, and workup during 

your brief time in the ER. Information about probable diagnosis and other 

educational material has been provided. Please take the time to read and 

understand this information.





It is very important that you follow up with a physician as discussed during the

visit today. Failure to adhere to your follow-up instructions may lead to severe

disability, injury, or death so please make sure to keep your appointments or 

obtain  one as requested. Please keep in mind the emergency department is not 

designed to your primary care or "family doctor" and nonurgent issues are best 

evaluated by an outpatient physician





All discharge instructions reviewed with patient and/or family. Voiced 

understanding.


Scripts


Ondansetron (Ondansetron Odt) 4 Mg Tab.rapdis


4 MG PO Q6H PRN for NAUSEA/VOMITING, #20 TAB


   Prov: MARIA E DAMON DO         12/31/19











MARIA E DAMON DO               Dec 31, 2019 00:17

## 2019-12-31 NOTE — DIAGNOSTIC IMAGING REPORT
INDICATION: Nausea, vomiting and diarrhea.



3 views were obtained.



FINDINGS: The bowel gas pattern is nonspecific. There are

surgical clips in right upper quadrant. There are no abnormal

abdominal calcifications.



IMPRESSION: Nonspecific bowel gas pattern.



Dictated by: 



  Dictated on workstation # [id number]

## 2020-01-02 NOTE — XMS REPORT
Continuity of Care Document

                             Created on: 2020



Cuco Millard

External Reference #: [id number]

: 2000

Sex: Female



Demographics





                          Home Phone                [phone]

 

                          Preferred Language        Unknown

 

                          Marital Status            Unknown

 

                          Faith Affiliation     Unknown

 

                          Race                      Unknown

 

                          Ethnic Group              Unknown





Author





                          Organization              Unknown

 

                          Address                   Unknown

 

                          Phone                     Unavailable



              



Allergies

      



             Active           Description           Code           Type         

  Severity   

                Reaction           Onset           Reported/Identified          

 

Relationship to Patient                 Clinical Status        

 

             Yes           latex           [id number]           Drug Allergy    

       

Unknown           RASH                        2019                        

  

      

 

             Yes           strawberry           [id number]           Drug Allerg

y           

Mild           N/A                        2019                            

  

  



                    



Medications

      



There is no data.                  



Problems

      



             Date Dx Coded           Attending           Type           Code    

       

Diagnosis                               Diagnosed By        

 

             2019           MADDIE PETER DO           Ot           J04

.0           

ACUTE LARYNGITIS                                 

 

                2019           MADDIE PETER DO L           Ot           

   J45.909         

                          UNSPECIFIED ASTHMA, UNCOMPLICATED                    

 

             2019           JUNE PETER DOIAN L           Ot           R05

           

COUGH                                            

 

                2019           MADDIE PETER DO L           Ot           

   Z79.52          

                          LONG TERM (CURRENT) USE OF SYSTEMIC STER              

      

 

                2019           JUNE PETER DOIAN L           Ot           

   Z91.040         

                          LATEX ALLERGY STATUS                    

 

             2019           JUNE PETER DOIAN L           Ot           J04

.0           

ACUTE LARYNGITIS                                 

 

                2019           JUNE PETER DOIAN L           Ot           

   J45.909         

                          UNSPECIFIED ASTHMA, UNCOMPLICATED                    

 

             2019           JUNE PETER DOIAN L           Ot           R05

           

COUGH                                            

 

                2019           JUNE PETER DOIAN L           Ot           

   Z79.52          

                          LONG TERM (CURRENT) USE OF SYSTEMIC STER              

      

 

                2019           BRITTANI DOJUNEMADDIE L           Ot           

   Z91.040         

                          LATEX ALLERGY STATUS                    

 

             2019           PETER DOJUNEMADDIE L           Ot           J04

.0           

ACUTE LARYNGITIS                                 

 

                2019           PETER DOJUNEMADDIE L           Ot           

   J45.909         

                          UNSPECIFIED ASTHMA, UNCOMPLICATED                    

 

             2019           PETER DO MADDIE L           Ot           R05

           

COUGH                                            

 

                2019           PETER DOJUNEMADDIE L           Ot           

   Z79.52          

                          LONG TERM (CURRENT) USE OF SYSTEMIC STER              

      

 

                2019           BRITTANI DOJUNEMADDIE L           Ot           

   Z91.040         

                          LATEX ALLERGY STATUS                    

 

             2019           PETER DOJUNEMADDIE L           Ot           J04

.0           

ACUTE LARYNGITIS                                 

 

                2019           PETER DOJUNEMADDIE L           Ot           

   J45.909         

                          UNSPECIFIED ASTHMA, UNCOMPLICATED                    

 

             2019           PETER DO, MADDIE L           Ot           R05

           

COUGH                                            

 

                2019           PETER MADDIE GABRIEL           Ot           

   Z79.52          

                          LONG TERM (CURRENT) USE OF SYSTEMIC STER              

      

 

                2019           BRITTANI MADDIE GABRIEL           Ot           

   Z91.040         

                          LATEX ALLERGY STATUS                    

 

             2019           YARELY MCKEON DO           Ot           A59.

09           

OTHER UROGENITAL TRICHOMONIASIS                    

 

             2019           YARELY MCKEON DO           Ot           E66.

01           

MORBID (SEVERE) OBESITY DUE TO EXCESS CA                    

 

                2019           YARELY MCKEON DO           Ot            

  J45.909          

                          UNSPECIFIED ASTHMA, UNCOMPLICATED                    

 

             2019           YARELY MCKEON DO           Ot           R11.

2           

NAUSEA WITH VOMITING, UNSPECIFIED                    

 

             2019           YARELY MCKEON DO           Ot           Z68.

43           

BODY MASS INDEX (BMI) 50-59.9, ADULT                    

 

                2019           YARELY MCKEON DO           Ot            

  Z91.040          

                          LATEX ALLERGY STATUS                    

 

             2019           YARELY MCKEON DO           Ot           A59.

09           

OTHER UROGENITAL TRICHOMONIASIS                    

 

             2019           YARELY MCKEON DO           Ot           E66.

01           

MORBID (SEVERE) OBESITY DUE TO EXCESS CA                    

 

                2019           YARELY MCKEON DO           Ot            

  J45.909          

                          UNSPECIFIED ASTHMA, UNCOMPLICATED                    

 

             2019           YARELY MCKEON DO           Ot           R11.

2           

NAUSEA WITH VOMITING, UNSPECIFIED                    

 

             2019           YARELY MCKEON DO           Ot           Z68.

43           

BODY MASS INDEX (BMI) 50-59.9, ADULT                    

 

                2019           YARELY MCKEON DO           Ot            

  Z91.040          

                          LATEX ALLERGY STATUS                    

 

                2019           YARELY MCKEON DO           Ot            

  J45.909          

                          UNSPECIFIED ASTHMA, UNCOMPLICATED                    

 

                2019           YARELY MCKEON DO           Ot            

  S91.114A         

                          LAC W/O FB OF RIGHT LESSER TOE(S) W/O DA              

      

 

                2019           YARELY MCKEON DO           Ot            

  W25.XXXA         

                          CONTACT WITH SHARP GLASS, INITIAL ENCOUN              

      

 

             2019           YARELY MCKEON DO           Ot           Z79.

52           

LONG TERM (CURRENT) USE OF SYSTEMIC STER                    

 

                2019           YARELY MCKEON DO           Ot            

  Z91.040          

                          LATEX ALLERGY STATUS                    

 

                2019           YARELY MCKEON DO           Ot            

  J45.909          

                          UNSPECIFIED ASTHMA, UNCOMPLICATED                    

 

                2019           YARELY MCKEON DO           Ot            

  S91.114A         

                          LAC W/O FB OF RIGHT LESSER TOE(S) W/O DA              

      

 

                2019           YARELY MCKEON DO           Ot            

  W25.XXXA         

                          CONTACT WITH SHARP GLASS, INITIAL ENCOUN              

      

 

             2019           YARELY MCKEON DO           Ot           Z79.

52           

LONG TERM (CURRENT) USE OF SYSTEMIC STER                    

 

                2019           YARELY MCKEON DO           Ot            

  Z91.040          

                          LATEX ALLERGY STATUS                    

 

                2019           YARELY MCKEON DO           Ot            

  J45.909          

                          UNSPECIFIED ASTHMA, UNCOMPLICATED                    

 

                2019           YARELY MCKEON DO           Ot            

  S91.114A         

                          LAC W/O FB OF RIGHT LESSER TOE(S) W/O DA              

      

 

                2019           YARELY MCKEON DO           Ot            

  W25.XXXA         

                          CONTACT WITH SHARP GLASS, INITIAL ENCOUN              

      

 

             2019           YARELY MCKEON DO           Ot           Z79.

52           

LONG TERM (CURRENT) USE OF SYSTEMIC STER                    

 

                2019           YARELY MCKEON DO           Ot            

  Z91.040          

                          LATEX ALLERGY STATUS                    

 

             2019           ISABEL AG MD, Ot           J45.9

09           

UNSPECIFIED ASTHMA, UNCOMPLICATED                    

 

             2019           ISABEL AG MD, Ot           M54.5

           

LOW BACK PAIN                                    

 

                2019           ISABEL AG MD, Ot             

 S29.019A          

                          STRAIN OF MUSCLE AND TENDON OF UNSP WALL              

      

 

                2019           ISABEL AG MD, Ot             

 S39.012A          

                          STRAIN OF MUSCLE, FASCIA AND TENDON OF L              

      

 

                2019           ISABEL AG MD, Ot             

 X50.1XXA          

                          OVEREXERTION FROM PROLONGED STATIC OR AW              

      

 

             2019           ISABEL AG MD, Ot           Z79.5

2           

LONG TERM (CURRENT) USE OF SYSTEMIC STER                    

 

             2019           ISABEL AG MD           Ot           Z91.0

18           

ALLERGY TO OTHER FOODS                           

 

             2019           ISABEL AG MD           Ot           Z91.0

40           

LATEX ALLERGY STATUS                             

 

             2019           ISABEL AG MD, Ot           J45.9

09           

UNSPECIFIED ASTHMA, UNCOMPLICATED                    

 

             2019           ISABEL AG MD, Ot           M54.5

           

LOW BACK PAIN                                    

 

                2019           ISABEL AG MD, Ot             

 S29.019A          

                          STRAIN OF MUSCLE AND TENDON OF UNSP WALL              

      

 

                2019           ISABEL AG MD           Ot             

 S39.012A          

                          STRAIN OF MUSCLE, FASCIA AND TENDON OF L              

      

 

                2019           ISABEL AG MD, Ot             

 X50.1XXA          

                          OVEREXERTION FROM PROLONGED STATIC OR AW              

      

 

             2019           ISABEL AG MD, Ot           Z79.5

2           

LONG TERM (CURRENT) USE OF SYSTEMIC STER                    

 

             2019           ISABEL AG MD, Ot           Z91.0

18           

ALLERGY TO OTHER FOODS                           

 

             2019           ISABEL AG MD, Ot           Z91.0

40           

LATEX ALLERGY STATUS                             

 

             2019           JONEL ZAMORA MD           Ot           J06.

9           

ACUTE UPPER RESPIRATORY INFECTION, UNSPE                    

 

                2019           JONEL ZAMORA MD           Ot            

  J45.909          

                          UNSPECIFIED ASTHMA, UNCOMPLICATED                    

 

             2019           JONEL ZAMORA MD           Ot           R07.

89           

OTHER CHEST PAIN                                 

 

             2019           JONEL ZAMORA MD           Ot           Z79.

52           

LONG TERM (CURRENT) USE OF SYSTEMIC STER                    

 

                2019           JONEL ZAMORA MD           Ot            

  Z91.040          

                          LATEX ALLERGY STATUS                    

 

             2019           JONEL ZAMORA MD           Ot           J06.

9           

ACUTE UPPER RESPIRATORY INFECTION, UNSPE                    

 

                2019           JONEL ZAMORA MD           Ot            

  J45.909          

                          UNSPECIFIED ASTHMA, UNCOMPLICATED                    

 

             2019           JONEL ZAMORA MD           Ot           R07.

89           

OTHER CHEST PAIN                                 

 

             2019           JONEL ZAMORA MD           Ot           Z79.

52           

LONG TERM (CURRENT) USE OF SYSTEMIC STER                    

 

                2019           JONEL ZAMORA MD           Ot            

  Z91.040          

                          LATEX ALLERGY STATUS                    



                                                                                
                                                                                




Procedures

      



There is no data.                  



Results

      



                    Test                Result              Range        

 

                                        Urine beta human chorionic gonadotropin 

(hCG) measurement - 19 23:47      

  

 

                          Urine beta human chorionic gonadotropin (hCG) measurem

ent           NEGATIVE    

                                        NEGATIVE        

 

                                        Complete urinalysis with reflex to cultu

re - 19 23:47         

 

                    Urine color determination           YELLOW              NRG 

       

 

                    Urine clarity determination           SLT CLOUDY            

NRG        

 

                    Urine pH measurement by test strip           6.0            

     5-9        

 

                    Specific gravity of urine by test strip           1.020     

          1.016-1.022  

     

 

                          Urine protein assay by test strip, semi-quantitative  

         NEGATIVE         

                                        NEGATIVE        

 

                    Urine glucose detection by automated test strip           NE

GATIVE            

NEGATIVE        

 

                          Erythrocytes detection in urine sediment by light micr

oscopy           TRACE    

                                        NEGATIVE        

 

                    Urine ketones detection by automated test strip           NE

GATIVE            

NEGATIVE        

 

                    Urine nitrite detection by test strip           NEGATIVE    

        NEGATIVE    

   

 

                    Urine total bilirubin detection by test strip           NEGA

TIVE            

NEGATIVE        

 

                          Urine urobilinogen measurement by automated test strip

 (mass/volume)           

1.0 mg/dL                               NORMAL        

 

                    Urine leukocyte esterase detection by dipstick           3+ 

                 NEGATIVE 

      

 

                                        Automated urine sediment erythrocyte cou

nt by microscopy (number/high power 

field)                     [HPF]                    NRG        

 

                                        Automated urine sediment leukocyte count

 by microscopy (number/high power field)

                           [HPF]                    NRG        

 

                          Bacteria detection in urine sediment by light microsco

py           MODERATE     

                                        NRG        

 

                                        Squamous epithelial cells detection in u

rine sediment by light microscopy       

                          2-5                       NRG        

 

                          Crystals detection in urine sediment by light microsco

py           NONE         

                                        NRG        

 

                    Casts detection in urine sediment by light microscopy       

    NONE                

NRG        

 

                    Mucus detection in urine sediment by light microscopy       

    NONE                

NRG        

 

                    Complete urinalysis with reflex to culture           YES    

             NRG        

 

                          Urine Trichomonas species detection by light microscop

y           FEW           

                                        NRG        

 

                                        Bacterial urine culture - 19 23:47

         

 

                    Bacterial urine culture           3 OR MORE            NRG  

      

 

                    COLONY COUNT           50,000 CFU/ML            NRG        

 

                    FTX;REPORTABLE           GRAM POSITIVE ISOLATES; SUGGESTING 

           NRG      

  

 

                    FREE TEXT ENTRY 2           PROBABLE COLLECTION CONTAMINATIO

N WITH            

NRG        

 

                    FREE TEXT ENTRY 3           SKIN BRIAN. NO SUSCEPTIBILITY PE

RFORMED.            

NRG        

 

                                        Complete urinalysis with reflex to cultu

re - 19 00:25         

 

                    Urine color determination           YELLOW              NRG 

       

 

                    Urine clarity determination           SLT CLOUDY            

NRG        

 

                    Urine pH measurement by test strip           5.5            

     5-9        

 

                    Specific gravity of urine by test strip           >=        

          1.016-1.022     

   

 

                          Urine protein assay by test strip, semi-quantitative  

         NEGATIVE         

                                        NEGATIVE        

 

                    Urine glucose detection by automated test strip           NE

GATIVE            

NEGATIVE        

 

                          Erythrocytes detection in urine sediment by light micr

oscopy           NEGATIVE 

                                        NEGATIVE        

 

                    Urine ketones detection by automated test strip           TR

ACE               

NEGATIVE        

 

                    Urine nitrite detection by test strip           NEGATIVE    

        NEGATIVE    

    

 

                    Urine total bilirubin detection by test strip           NEGA

TIVE            

NEGATIVE        

 

                          Urine urobilinogen measurement by automated test strip

 (mass/volume)           

0.2 mg/dL                               NORMAL        

 

                    Urine leukocyte esterase detection by dipstick           NEG

ATIVE            

NEGATIVE        

 

                                        Automated urine sediment erythrocyte cou

nt by microscopy (number/high power 

field)                     [HPF]                    NRG        

 

                                        Automated urine sediment leukocyte count

 by microscopy (number/high power field)

                           [HPF]                    NRG        

 

                          Bacteria detection in urine sediment by light microsco

py           MODERATE     

                                        NRG        

 

                                        Squamous epithelial cells detection in u

rine sediment by light microscopy       

                          25-50                     NRG        

 

                          Crystals detection in urine sediment by light microsco

py           NONE         

                                        NRG        

 

                    Casts detection in urine sediment by light microscopy       

    NONE                

NRG        

 

                    Mucus detection in urine sediment by light microscopy       

    NONE                

NRG        

 

                    Complete urinalysis with reflex to culture           NO     

             NRG        

 

                                        Urine beta human chorionic gonadotropin 

(hCG) measurement - 19 00:25      

   

 

                          Urine beta human chorionic gonadotropin (hCG) measurem

ent           NEGATIVE    

                                        NEGATIVE        

 

                                        PREGNANCY TEST, SERUM (QUAL) - 19 

10:12         

 

                    HCG, TOTAL, QL           NEGATIVE            See Note:      

  

 

                                        SUREPATH PAP RFX HPV mRNA E6/E7 -  09:48         

 

                    CLINICAL INFORMATION:                               NRG     

   

 

                    LMP:                                    NRG        

 

                    PREV. PAP:                               NRG        

 

                    PREV. BX:                               NRG        

 

                    SOURCE:             [employer]              NRG        

 

                    STATEMENT OF ADEQUACY:                               NRG    

    

 

                    INTERPRETATION/RESULT:                               NRG    

    

 

                    CYTOTECHNOLOGIST:                               NRG        

 

                    INFECTION:                               NRG        

 

                    COMMENT                                 NRG        

 

                                        Influenza virus A and B antigen detectio

n - 19 14:50         

 

                    FLU RESULT           NEGATIVE FOR INFLUENZA A AND B ANTIGENS

 BY IA            

NRG        

 

                                        Complete urinalysis with reflex to cultu

re - 19 00:31         

 

                    Urine color determination           YELLOW              NRG 

       

 

                    Urine clarity determination           CLEAR               NR

G        

 

                    Urine pH measurement by test strip           6.0            

     5-9        

 

                    Specific gravity of urine by test strip           1.025     

          1.016-1.022  

      

 

                          Urine protein assay by test strip, semi-quantitative  

         NEGATIVE         

                                        NEGATIVE        

 

                    Urine glucose detection by automated test strip           NE

GATIVE            

NEGATIVE        

 

                          Erythrocytes detection in urine sediment by light micr

oscopy           NEGATIVE 

                                        NEGATIVE        

 

                    Urine ketones detection by automated test strip           NE

GATIVE            

NEGATIVE        

 

                    Urine nitrite detection by test strip           NEGATIVE    

        NEGATIVE    

    

 

                    Urine total bilirubin detection by test strip           NEGA

TIVE            

NEGATIVE        

 

                          Urine urobilinogen measurement by automated test strip

 (mass/volume)           

0.2 mg/dL                               < = 1.0        

 

                    Urine leukocyte esterase detection by dipstick           NEG

ATIVE            

NEGATIVE        

 

                                        Automated urine sediment erythrocyte cou

nt by microscopy (number/high power 

field)                    NONE                      NRG        

 

                                        Automated urine sediment leukocyte count

 by microscopy (number/high power field)

                          RARE                      NRG        

 

                          Bacteria detection in urine sediment by light microsco

py           TRACE        

                                        NRG        

 

                                        Squamous epithelial cells detection in u

rine sediment by light microscopy       

                          10-25                     NRG        

 

                          Crystals detection in urine sediment by light microsco

py           NONE         

                                        NRG        

 

                    Casts detection in urine sediment by light microscopy       

    NONE                

NRG        

 

                          Mucus detection in urine sediment by light microscopy 

          NEGATIVE        

                                        NRG        

 

                    Complete urinalysis with reflex to culture           NO     

             NRG        

 

                                        Automated blood complete blood count (he

mogram) panel - 19 00:31         

 

                          Blood leukocytes automated count (number/volume)      

     11.2 10*3/uL         

                                        4.3-11.0        

 

                          Blood erythrocytes automated count (number/volume)    

       4.52 10*6/uL       

                                        4.35-5.85        

 

                    Venous blood hemoglobin measurement (mass/volume)           

13.6 g/dL           

11.5-16.0        

 

                    Blood hematocrit (volume fraction)           41 %           

     35-52        

 

                    Automated erythrocyte mean corpuscular volume           90 [

foz_us]           

80-99        

 

                                        Automated erythrocyte mean corpuscular h

emoglobin (mass per erythrocyte)        

                          30 pg                     25-34        

 

                                        Automated erythrocyte mean corpuscular h

emoglobin concentration measurement 

(mass/volume)             33 g/dL                   32-36        

 

                    Automated erythrocyte distribution width ratio           13.

0 %              10.0-

14.5        

 

                    Automated blood platelet count (count/volume)           305 

10*3/uL           

130-400        

 

                          Automated blood platelet mean volume measurement      

     8.8 [foz_us]         

                                        7.4-10.4        

 

                                        Comprehensive metabolic panel - 19

 00:31         

 

                          Serum or plasma sodium measurement (moles/volume)     

      141 mmol/L          

                                        135-145        

 

                          Serum or plasma potassium measurement (moles/volume)  

         4.0 mmol/L       

                                        3.6-5.0        

 

                          Serum or plasma chloride measurement (moles/volume)   

        106 mmol/L        

                                                

 

                    Carbon dioxide           23 mmol/L           21-32        

 

                          Serum or plasma anion gap determination (moles/volume)

           12 mmol/L      

                                        5-14        

 

                          Serum or plasma urea nitrogen measurement (mass/volume

)           11 mg/dL      

                                        7-18        

 

                          Serum or plasma creatinine measurement (mass/volume)  

         0.73 mg/dL       

                                        0.60-1.30        

 

                    Serum or plasma urea nitrogen/creatinine mass ratio         

  15                  NRG 

       

 

                                        Serum or plasma creatinine measurement w

ith calculation of estimated glomerular 

filtration rate           >                         NRG        

 

                    Serum or plasma glucose measurement (mass/volume)           

98 mg/dL            

        

 

                    Serum or plasma calcium measurement (mass/volume)           

9.5 mg/dL           

8.5-10.1        

 

                          Serum or plasma total bilirubin measurement (mass/volu

me)           0.7 mg/dL   

                                        0.1-1.0        

 

                                        Serum or plasma alkaline phosphatase christiano

surement (enzymatic activity/volume)    

                          54 U/L                            

 

                                        Serum or plasma aspartate aminotransfera

se measurement (enzymatic 

activity/volume)           14 U/L                    5-34        

 

                                        Serum or plasma alanine aminotransferase

 measurement (enzymatic activity/volume)

                          13 U/L                    0-55        

 

                    Serum or plasma protein measurement (mass/volume)           

7.5 g/dL            

6.4-8.2        

 

                    Serum or plasma albumin measurement (mass/volume)           

4.4 g/dL            

3.2-4.5        

 

                    CALCIUM CORRECTED           9.2 mg/dL           8.5-10.1    

    

 

                                        Lipase - 19 00:31         

 

                    Lipase              30 U/L              8-78        



                                      



Encounters

      



                ACCT No.           Visit Date/Time           Discharge          

 Status         

             Pt. Type           Provider           Facility           Loc./Unit 

          

Complaint        

 

             [id number]           2019 09:00:00                              

       Document

 Registration                                                                   

 

 

             [id number]           2019 10:00:00                              

       Document

 Registration                                                                   

 

 

                    [id number]           2019 00:06:00            01:14:00        

                DIS             Emergency           MARIA E DAMON DO           

[facility]           ER FS                     VOMITING        

 

                    [id number]           2019 14:05:00            15:54:00        

                DIS             Emergency           NOAH MANZANO, JONEL BLANCO          

 [facility]           ER FS                     CHEST TIGHTNESS        

 

                    [id number]           2019 23:57:00            02:10:00        

                DIS             Emergency           ANCELMO MANZANO, ISABEL WALTERS           

[facility]           ER FS                     BACK PAIN        

 

                    [id number]           2019 12:18:00            13:42:00        

                DIS             Emergency           YARELY MCKEON DO          

 [facility]           ER FS                     RT TOE LAC        

 

                    [id number]           2019 23:13:00            00:24:00        

                DIS             Emergency           YARELY MCKEON DO          

 [facility]           ER FS                     VOMITING, FATIGUED     

   

 

                    [id number]           2019 00:37:00            01:40:00        

                DIS             Emergency           MADDIE PETER DO         

  [facility]           ER FS                     COUGH-HARD TO BREATHE

## 2020-01-05 ENCOUNTER — HOSPITAL ENCOUNTER (EMERGENCY)
Dept: HOSPITAL 75 - ER FS | Age: 20
Discharge: HOME | End: 2020-01-05
Payer: COMMERCIAL

## 2020-01-05 VITALS — WEIGHT: 293 LBS | HEIGHT: 65.75 IN | BODY MASS INDEX: 47.65 KG/M2

## 2020-01-05 VITALS — DIASTOLIC BLOOD PRESSURE: 88 MMHG | SYSTOLIC BLOOD PRESSURE: 151 MMHG

## 2020-01-05 DIAGNOSIS — F41.9: ICD-10-CM
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
## 2020-01-05 DIAGNOSIS — J45.909: ICD-10-CM

## 2020-01-05 DIAGNOSIS — F32.9: ICD-10-CM

## 2020-01-05 DIAGNOSIS — Z91.040: ICD-10-CM

## 2020-01-05 DIAGNOSIS — Z90.89: ICD-10-CM

## 2020-01-05 DIAGNOSIS — Z79.52: ICD-10-CM

## 2020-01-05 DIAGNOSIS — S46.912A: Primary | ICD-10-CM

## 2020-01-05 PROCEDURE — 96372 THER/PROPH/DIAG INJ SC/IM: CPT

## 2020-01-05 PROCEDURE — 99284 EMERGENCY DEPT VISIT MOD MDM: CPT

## 2020-01-05 NOTE — ED UPPER EXTREMITY
General


Chief Complaint:  Upper Extremity


Stated Complaint:  LEFT SHOULD PAIN


Nursing Triage Note:  


PT COMPLAINING OF LEFT SHOULDER PAIN AFTER LIFTING PTS AT WORK A COUPLE OF DAYS 


AGO.


Nursing Sepsis Screen:  No Definite Risk





History of Present Illness


Date Seen by Provider:  Jan 5, 2020


Time Seen by Provider:  23:00


Initial Comments


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 

a patient several days ago and felt it start to hurt at that time with that 

event. This progressed pain was mostly just the upper posterior shoulder itself 

range of motion is limited due to pain but nothing has been taking some 

ibuprofen for the


Onset:  last week


Pain/Injury Location:  left shoulder





Allergies and Home Medications


Allergies


Coded Allergies:  


     strawberry (Unverified  Allergy, Mild, 3/3/19)


     latex (Verified  Allergy, Unknown, RASH, 3/3/19)





Home Medications


Azithromycin 250 Mg Tablet, 250 MG PO DAILY


   Prescribed by: MADDIE PETER on 3/3/19 0105


Azithromycin 250 Mg Tablet, 250 MG PO UD


   TAKE 2 TABLETS ON DAY ONE THEN TAKE 1 TABLET DAILY FOR FOUR MORE DAYS 


   Prescribed by: JONEL ZAMORA on 12/7/19 1550


Baclofen 10 Mg Tablet, 10 MG PO TID PRN for MUSCLE SPASMS


   Prescribed by: ISABEL AG on 8/27/19 0207


Hydrocodone Bit/Acetaminophen 1 Tab Tab, 1 EACH PO Q6H PRN for PAIN-MODERATE


   Prescribed by: ISABEL AG on 8/27/19 0207


Hydrocodone/Acetaminophen 10 Ml Solution, 5 ML PO Q4H PRN for COUGH


   Prescribed by: MADDIE PETER on 3/3/19 0106


Ibuprofen 800 Mg Tablet, 800 MG PO Q8H PRN for PAIN


   Prescribed by: ISABEL AG on 8/27/19 0207


Ondansetron 4 Mg Tab.rapdis, 4 MG PO Q6H PRN for NAUSEA/VOMITING


   Prescribed by: MARIA E DAMON on 12/31/19 0111


Oseltamivir Phosphate 75 Mg Capsule, 1 CAP PO BID


   Prescribed by: ROBER BRAND on 8/25/09 0112


Prednisone 50 Mg Tab, 50 MG PO DAILY


   Prescribed by: MADDIE PETER on 3/3/19 0105


Sulfamethoxazole/Trimethoprim 1 Each Tablet, 1 EACH PO BID


   Prescribed by: LINDA PRITCHETT on 7/19/19 0018





Patient Home Medication List


Home Medication List Reviewed:  Yes





Review of Systems


Constitutional:  No chills, No fever


EENTM:  no symptoms reported


Respiratory:  no symptoms reported


Cardiovascular:  no symptoms reported


Musculoskeletal:  No back pain; joint pain; No joint swelling; muscle stiffness


Skin:  no symptoms reported





Past Medical-Social-Family Hx


Past Med/Social Hx:  Reviewed Nursing Past Med/Soc Hx


Patient Social History


Alcohol Use:  Denies Use


Recreational Drug Use:  No


Type Used:  Cigarettes


2nd Hand Smoke Exposure:  No


Recent Foreign Travel:  No


Contact w/Someone Who Travel:  No


Recent Infectious Disease Expo:  No


Recent Hopitalizations:  No


Physical Abuse:  No


Sexual Abuse:  No


Mistreated:  No





Immunizations Up To Date


Tetanus Booster (TDap):  Unknown





Seasonal Allergies


Seasonal Allergies:  No





Past Medical History


Surgeries:  Yes


Adenoidectomy, Gallbladder, Tonsillectomy


Respiratory:  Yes


Asthma


Cardiac:  No


Neurological:  No


Genitourinary:  No


Gastrointestinal:  No


Musculoskeletal:  No


Endocrine:  No


HEENT:  No


Cancer:  No


Psychosocial:  Yes


Anxiety, Depression


Integumentary:  No


Blood Disorders:  No





Physical Exam


Vital Signs





Vital Signs - First Documented








 1/5/20





 22:47


 


Temp 36.0


 


Pulse 96


 


Resp 18


 


B/P (MAP) 151/88 (109)


 


Pulse Ox 96


 


O2 Delivery Room Air





Capillary Refill : Less Than 3 Seconds


Height, Weight, BMI


Height: 5'5.50"


Weight: 334lbs. 0oz. 151.897611vq; 51.00 BMI


Method:Stated


General Appearance:  WD/WN, mild distress


Neck:  non-tender, full range of motion


Cardiovascular:  regular rate, rhythm, no murmur


Respiratory:  chest non-tender, lungs clear


Back:  normal inspection, no vertebral tenderness


Shoulder:  No normal ROM, No asymmetry, No bone tenderness, No deformity; 

limited ROM (poor range of motion with point tenderness over the upper posterior

left shoulder joint itself no paraspinous muscle spasm or tenderness noted), 

pain; No swelling


Neurologic/Tendon:  normal sensation, normal motor functions


Neurologic/Psychiatric:  no motor/sensory deficits, alert, oriented x 3





Progress/Results/Core Measures


Results/Orders


My Orders





Orders - ADILIA CAVAZOS JR, MD


Ketorolac Injection (Toradol Injection) (1/5/20 23:15)


Orphenadrine Injection (Norflex Injectio (1/5/20 23:15)





Vital Signs/I&O











 1/5/20





 22:47


 


Temp 36.0


 


Pulse 96


 


Resp 18


 


B/P (MAP) 151/88 (109)


 


Pulse Ox 96


 


O2 Delivery Room Air














Blood Pressure Mean:                    109











Departure


Impression





   Primary Impression:  


   Left shoulder strain


   Qualified Codes:  S46.912A - Strain of unspecified muscle, fascia and tendon 

   at shoulder and upper arm level, left arm, initial encounter


Disposition:  01 HOME, SELF-CARE


Condition:  Stable





Departure-Patient Inst.


Referrals:  


CHIDI KUMAR MD (PCP/Family)


Primary Care Physician


Patient Instructions:  Shoulder Pain (DC)


Scripts


Cyclobenzaprine HCl (Cyclobenzaprine HCl) 5 Mg Tablet


5 MG PO DAILY PRN for MUSCLE SPASMS, #5 TAB


   Prov: ADILIA CAVAZOS JR, MD         1/5/20











ADILIA CAVAZOS JR, MD         Jan 5, 2020 23:14

## 2020-01-12 ENCOUNTER — HOSPITAL ENCOUNTER (EMERGENCY)
Dept: HOSPITAL 75 - ER FS | Age: 20
Discharge: HOME | End: 2020-01-12
Payer: COMMERCIAL

## 2020-01-12 VITALS — DIASTOLIC BLOOD PRESSURE: 92 MMHG | SYSTOLIC BLOOD PRESSURE: 132 MMHG

## 2020-01-12 VITALS — HEIGHT: 66.02 IN | BODY MASS INDEX: 47.09 KG/M2 | WEIGHT: 293 LBS

## 2020-01-12 DIAGNOSIS — Z90.89: ICD-10-CM

## 2020-01-12 DIAGNOSIS — Z79.52: ICD-10-CM

## 2020-01-12 DIAGNOSIS — Z91.040: ICD-10-CM

## 2020-01-12 DIAGNOSIS — F41.9: ICD-10-CM

## 2020-01-12 DIAGNOSIS — S92.902A: Primary | ICD-10-CM

## 2020-01-12 DIAGNOSIS — F32.9: ICD-10-CM
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
## 2020-01-12 DIAGNOSIS — J45.909: ICD-10-CM

## 2020-01-12 PROCEDURE — 73630 X-RAY EXAM OF FOOT: CPT

## 2020-01-12 NOTE — DIAGNOSTIC IMAGING REPORT
INDICATION: Pain status post injury.



COMPARISON: None



FINDINGS: 3 radiographic views of the left foot were obtained and

show acute nondisplaced obliquely oriented fracture of the distal

5th metatarsal. There is no intra-articular extension. Joint

spaces are maintained. No other acute osseous abnormality is

seen. No unexpected radiopaque foreign bodies are identified.



IMPRESSION:

1. Acute fracture of the left 5th metatarsal as above.



Dictated by: 



  Dictated on workstation # [id number]

## 2020-01-12 NOTE — ED LOWER EXTREMITY
General


Chief Complaint:  Lower Extremity


Stated Complaint:  LEFT FOOT PAIN


Source:  patient


Exam Limitations:  no limitations





History of Present Illness


Date Seen by Provider:  Jan 12, 2020


Time Seen by Provider:  19:26


Initial Comments


Walking yesterday and states she tripped and hit her left foot on "herself".  

Moderate pain in her foot since and difficulty with weightbearing, she states 

she is walking with a limp.  Denies history of foot or ankle fracture or pro

blems.  Some bruising to her foot with mild swelling.


Onset:  yesterday


Severity:  mild





Allergies and Home Medications


Allergies


Coded Allergies:  


     strawberry (Unverified  Allergy, Mild, 3/3/19)


     latex (Verified  Allergy, Unknown, RASH, 3/3/19)





Home Medications


Azithromycin 250 Mg Tablet, 250 MG PO DAILY


   Prescribed by: MADDIE PETER on 3/3/19 0105


Azithromycin 250 Mg Tablet, 250 MG PO UD


   TAKE 2 TABLETS ON DAY ONE THEN TAKE 1 TABLET DAILY FOR FOUR MORE DAYS 


   Prescribed by: JONEL ZAMORA on 12/7/19 1550


Baclofen 10 Mg Tablet, 10 MG PO TID PRN for MUSCLE SPASMS


   Prescribed by: ISABEL AG on 8/27/19 0207


Cyclobenzaprine HCl 5 Mg Tablet, 5 MG PO DAILY PRN for MUSCLE SPASMS


   Prescribed by: ADILIA CAVAZOS on 1/5/20 2316


Hydrocodone Bit/Acetaminophen 1 Tab Tab, 1 EACH PO Q6H PRN for PAIN-MODERATE


   Prescribed by: ISABEL AG on 8/27/19 0207


Hydrocodone/Acetaminophen 10 Ml Solution, 5 ML PO Q4H PRN for COUGH


   Prescribed by: MADDIE PETER on 3/3/19 0106


Ibuprofen 800 Mg Tablet, 800 MG PO Q8H PRN for PAIN


   Prescribed by: ISABEL AG on 8/27/19 0207


Ondansetron 4 Mg Tab.rapdis, 4 MG PO Q6H PRN for NAUSEA/VOMITING


   Prescribed by: MARIA E DAMON on 12/31/19 0111


Oseltamivir Phosphate 75 Mg Capsule, 1 CAP PO BID


   Prescribed by: ROBER BRAND on 8/25/09 0112


Prednisone 50 Mg Tab, 50 MG PO DAILY


   Prescribed by: MADDIE PETER on 3/3/19 0105


Sulfamethoxazole/Trimethoprim 1 Each Tablet, 1 EACH PO BID


   Prescribed by: LINDA PRITCHETT on 7/19/19 0018





Patient Home Medication List


Home Medication List Reviewed:  Yes





Review of Systems


Constitutional:  no symptoms reported


Musculoskeletal:  see HPI, other (pain, bruiding and swelling of foot)


Skin:  change in color; No change in hair/nails





Past Medical-Social-Family Hx


Past Med/Social Hx:  Reviewed Nursing Past Med/Soc Hx


Patient Social History


Type Used:  Cigarettes


2nd Hand Smoke Exposure:  No


Recent Foreign Travel:  No


Contact w/Someone Who Travel:  No


Recent Hopitalizations:  No





Immunizations Up To Date


Tetanus Booster (TDap):  Unknown





Seasonal Allergies


Seasonal Allergies:  No





Past Medical History


Surgeries:  Yes


Adenoidectomy, Gallbladder, Tonsillectomy


Respiratory:  Yes


Asthma


Cardiac:  No


Neurological:  No


Genitourinary:  No


Gastrointestinal:  No


Musculoskeletal:  No


Endocrine:  No


HEENT:  No


Cancer:  No


Psychosocial:  Yes


Anxiety, Depression


Integumentary:  No


Blood Disorders:  No





Physical Exam


Vital Signs





Vital Signs - First Documented








 1/12/20





 19:25


 


Temp 35.9


 


Pulse 113


 


Resp 18


 


B/P (MAP) 132/92 (105)


 


Pulse Ox 98


 


O2 Delivery Room Air





Capillary Refill :


Height, Weight, BMI


Height: 5'5.50"


Weight: 334lbs. 0oz. 151.598022rd; 51.00 BMI


Method:Stated


General Appearance:  WD/WN, no apparent distress


Ankles:  bilateral ankle non-tender, bilateral ankle normal inspection


Feet:  bilateral foot normal inspection; left foot bone tenderness, left foot 

ecchymosis, left foot limited range of motion, left foot soft tissue tenderness,

left foot swelling


Neurologic/Psychiatric:  no motor/sensory deficits, alert


Skin:  ecchymosis (distl 3rd and 4th MT)





Progress/Results/Core Measures


Results/Orders


My Orders





Orders - ARCHIE ADAMSON DO


Foot 3 View Left (1/12/20 19:25)





Vital Signs/I&O











 1/12/20





 19:25


 


Temp 35.9


 


Pulse 113


 


Resp 18


 


B/P (MAP) 132/92 (105)


 


Pulse Ox 98


 


O2 Delivery Room Air











Departure


Impression





   Primary Impression:  


   Fracture of foot


   Qualified Codes:  S92.902A - Unspecified fracture of left foot, initial 

   encounter for closed fracture


Disposition:  01 HOME, SELF-CARE


Condition:  Stable





Departure-Patient Inst.


Decision time for Depature:  19:35


Referrals:  


CHIDI KUMAR MD (PCP/Family)


Primary Care Physician


Patient Instructions:  Foot Fracture (DC)





Add. Discharge Instructions:  


Do NOT bear any weight on your left foot except for when wearing your 

"Orthopedic Boot".  





See your Primary Doctor in 2 weeks for evaluation.





All discharge instructions reviewed with patient and/or family. Voiced 

understanding.











ARCHIE ADAMSON DO         Jan 12, 2020 19:29

## 2020-01-17 ENCOUNTER — HOSPITAL ENCOUNTER (EMERGENCY)
Dept: HOSPITAL 75 - ER FS | Age: 20
Discharge: HOME | End: 2020-01-17
Payer: COMMERCIAL

## 2020-01-17 VITALS — HEIGHT: 66.02 IN | WEIGHT: 293 LBS | BODY MASS INDEX: 47.09 KG/M2

## 2020-01-17 VITALS — DIASTOLIC BLOOD PRESSURE: 86 MMHG | SYSTOLIC BLOOD PRESSURE: 141 MMHG

## 2020-01-17 DIAGNOSIS — Z79.52: ICD-10-CM

## 2020-01-17 DIAGNOSIS — F41.9: ICD-10-CM
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
## 2020-01-17 DIAGNOSIS — S92.902D: Primary | ICD-10-CM

## 2020-01-17 DIAGNOSIS — F32.9: ICD-10-CM

## 2020-01-17 DIAGNOSIS — F17.210: ICD-10-CM

## 2020-01-17 DIAGNOSIS — Z90.89: ICD-10-CM

## 2020-01-17 DIAGNOSIS — J45.909: ICD-10-CM

## 2020-01-17 PROCEDURE — 99284 EMERGENCY DEPT VISIT MOD MDM: CPT

## 2020-01-17 PROCEDURE — 96372 THER/PROPH/DIAG INJ SC/IM: CPT

## 2020-01-17 NOTE — ED LOWER EXTREMITY
General


Chief Complaint:  Lower Extremity


Stated Complaint:  FOOT/KNEE PAIN


Nursing Triage Note:  


Patient states that she was seen in the ER on 1/12/20 and was diagnosed with a 


fractured left foot.  She was told to follow up and stated that she couldn't get




an appointment until 1/28/20.  This RN asked if she made that appointment and 


she stated that she did not because she was supposed to see an orthopedist.  She




states that she called and they have not called her back.


Nursing Sepsis Screen:  No Definite Risk


Source:  patient, family (sister)


Exam Limitations:  no limitations





History of Present Illness


Date Seen by Provider:  Jan 17, 2020


Time Seen by Provider:  20:19


Initial Comments


Patient presents to the ER by private conveyance with her sister and chief 

complaint that she broke her foot a day or 2 ago and came to the ER where she 

was discovered to have a fracture and was put in a boot. She says it's hurting 

despite Tylenol and ibuprofen. 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 She took ibuprofen yesterday and it

did not help so she tried Tylenol tonight with her last dose being at 6:00 in 

the evening. She also feels that this is not helping. She is not taking them 

routinely or together.





Allergies and Home Medications


Allergies


Coded Allergies:  


     strawberry (Unverified  Allergy, Mild, 3/3/19)


     latex (Verified  Allergy, Unknown, RASH, 3/3/19)





Home Medications


Azithromycin 250 Mg Tablet, 250 MG PO DAILY


   Prescribed by: MADDIE PETER on 3/3/19 0105


Azithromycin 250 Mg Tablet, 250 MG PO UD


   TAKE 2 TABLETS ON DAY ONE THEN TAKE 1 TABLET DAILY FOR FOUR MORE DAYS 


   Prescribed by: JONEL ZAMORA on 12/7/19 1550


Baclofen 10 Mg Tablet, 10 MG PO TID PRN for MUSCLE SPASMS


   Prescribed by: ISABLE AG on 8/27/19 0207


Cyclobenzaprine HCl 5 Mg Tablet, 5 MG PO DAILY PRN for MUSCLE SPASMS


   Prescribed by: ADILIA CAVAZOS on 1/5/20 2316


Hydrocodone Bit/Acetaminophen 1 Tab Tab, 1 EACH PO Q6H PRN for PAIN-MODERATE


   Prescribed by: ISABEL AG on 8/27/19 0207


Hydrocodone/Acetaminophen 10 Ml Solution, 5 ML PO Q4H PRN for COUGH


   Prescribed by: MADDIE PETER on 3/3/19 0106


Ibuprofen 800 Mg Tablet, 800 MG PO Q8H PRN for PAIN


   Prescribed by: ISABEL AG on 8/27/19 0207


Ondansetron 4 Mg Tab.rapdis, 4 MG PO Q6H PRN for NAUSEA/VOMITING


   Prescribed by: MARIA E DAMON on 12/31/19 0111


Oseltamivir Phosphate 75 Mg Capsule, 1 CAP PO BID


   Prescribed by: ROBER BRAND on 8/25/09 0112


Prednisone 50 Mg Tab, 50 MG PO DAILY


   Prescribed by: MADDIE PETER on 3/3/19 0105


Sulfamethoxazole/Trimethoprim 1 Each Tablet, 1 EACH PO BID


   Prescribed by: LINDA PRITCHETT on 7/19/19 0018





Patient Home Medication List


Home Medication List Reviewed:  Yes





Review of Systems


Constitutional:  No chills, No diaphoresis


EENTM:  No ear discharge, No ear pain


Respiratory:  No cough, No short of breath


Cardiovascular:  No chest pain, No palpitations


Gastrointestinal:  No abdominal pain, No nausea


Genitourinary:  No discharge, No dysuria





All Other Systems Reviewed


Negative Unless Noted:  Yes





Past Medical-Social-Family Hx


Patient Social History


Alcohol Use:  Denies Use


Recreational Drug Use:  No


Smoking Status:  Current Everyday Smoker


Type Used:  Cigarettes


2nd Hand Smoke Exposure:  No


Recent Foreign Travel:  No


Contact w/Someone Who Travel:  No


Recent Infectious Disease Expo:  No


Recent Hopitalizations:  No





Immunizations Up To Date


Tetanus Booster (TDap):  Unknown





Seasonal Allergies


Seasonal Allergies:  No





Past Medical History


Surgeries:  Yes


Adenoidectomy, Gallbladder, Tonsillectomy


Respiratory:  Yes


Asthma


Cardiac:  No


Neurological:  No


Genitourinary:  No


Gastrointestinal:  No


Musculoskeletal:  No


Endocrine:  No


HEENT:  No


Cancer:  No


Psychosocial:  Yes


Anxiety, Depression


Integumentary:  No


Blood Disorders:  No





Physical Exam


Vital Signs





Vital Signs - First Documented








 1/17/20





 20:17


 


Temp 36.7


 


Pulse 86


 


Resp 22


 


B/P (MAP) 141/86 (104)


 


Pulse Ox 98


 


O2 Delivery Room Air





Capillary Refill : Less Than 3 Seconds


Height, Weight, BMI


Height: 5'5.50"


Weight: 334lbs. 0oz. 151.136679kn; 54.00 BMI


Method:Stated


General Appearance:  mild distress, obese


HEENT:  PERRL/EOMI, pharynx normal


Neck:  full range of motion, normal inspection


Cardiovascular:  normal peripheral pulses, regular rate, rhythm


Respiratory:  no respiratory distress, no accessory muscle use


Ankles:  bilateral ankle non-tender, bilateral ankle normal inspection, 

bilateral ankle normal range of motion, bilateral ankle no evidence of injury


Feet:  right foot non-tender, right foot normal inspection; bilateral foot 

normal range of motion; right foot no evidence of injury; left foot bone 

tenderness, left foot pain


Neurologic/Tendon:  normal sensation, normal motor functions, normal tendon 

functions, responds to pain, no evidence tendon injury


Neurologic/Psychiatric:  no motor/sensory deficits, alert, normal mood/affect, 

oriented x 3


Skin:  normal color, warm/dry





Progress/Results/Core Measures


Results/Orders


My Orders





Orders - NOLBERTO BROWER


Ketorolac Injection (Toradol Injection) (1/17/20 20:45)


Rx-Hydrocodone/Apap 5-325 Mg (Rx-Vicodin (1/17/20 20:45)





Vital Signs/I&O











 1/17/20





 20:17


 


Temp 36.7


 


Pulse 86


 


Resp 22


 


B/P (MAP) 141/86 (104)


 


Pulse Ox 98


 


O2 Delivery Room Air














Blood Pressure Mean:                    104











Progress


Progress Note :  


   Time:  20:36


Progress Note


Perhaps weightbearing is too much for her pain at this time so we'll put her on 

some crutches for a few days in addition to her boot. We'll also provide her 

with a few days' worth of hydrocodone and encourage her to follow-up with the 

orthopedic surgeon. Toradol shot tonight. Close examination of her foot does not

reveal evidence for apartment syndrome or too much compression from her boot.





Departure


Impression





   Primary Impression:  


   Fracture of foot


   Qualified Codes:  S92.902D - Unspecified fracture of left foot, subsequent 

   encounter for fracture with routine healing


Disposition:  01 HOME, SELF-CARE


Condition:  Stable





Departure-Patient Inst.


Decision time for Depature:  20:46


Referrals:  


CHIDI KUMAR MD (PCP/Family)


Primary Care Physician


Patient Instructions:  Foot Fracture (DC)





Add. Discharge Instructions:  


Continue to wear the boot at all times when weightbearing.


You're going to be up for an extended amount of time then you may use the 

crutches for the first week to reduce pressure on the foot.


For the first 2-3 days you should apply an ice pack for 20 minutes every 4 hours

while awake to the foot to reduce swelling and pain.


Keep the foot elevated whenever possible.


Tylenol 650 mg every 6 hours as needed for pain.


Additionally you may use ibuprofen 600 mg every 6 hours as needed for pain.


If you're still having severe pain keeping you from being functional then you 

may use one tablet of hydrocodone every 6 hours.


Hydrocodone will cause drowsiness and increase your risk of falls.


Hydrocodone will cause constipation so you should take a stool softener such as 

Colace or laxative such as MiraLAX daily to prevent this.


Please make follow-up appointments with the orthopedic surgeon as instructed.


Light-duty restrictions of no lifting pushing or pulling more than 20 pounds for

2 weeks, until 1/31/20.





All discharge instructions reviewed with patient and/or family. Voiced 

understanding.


Scripts


Hydrocodone Bit/Acetaminophen (Hydrocodone/Acetaminophen 5/325mg Tablet) 1 Tab T

ab


1 EACH PO Q4-6HR PRN for PAIN-MODERATE MDD 10 for 3 Days, #8 TAB 0 Refills


   Prov: NOLBERTO BROWER         1/17/20


Work/School Note:  Work Release Form   Date Seen in the Emergency Department:  

Jan 17, 2020


   Return to Work:  Jan 17, 2020


   Restrictions:  Need Release from Doctor


   Other Restrictions Listed Below:  No lifting, pushing or pulling more than 20

pounds until 1/31/20.











NOLBERTO BROWER                 Jan 17, 2020 20:38

## 2020-01-22 ENCOUNTER — HOSPITAL ENCOUNTER (OUTPATIENT)
Dept: HOSPITAL 75 - RAD FS | Age: 20
End: 2020-01-22
Attending: FAMILY MEDICINE
Payer: COMMERCIAL

## 2020-01-22 DIAGNOSIS — S92.355D: Primary | ICD-10-CM

## 2020-01-22 PROCEDURE — 73630 X-RAY EXAM OF FOOT: CPT

## 2020-01-22 NOTE — DIAGNOSTIC IMAGING REPORT
INDICATION: Follow-up left foot fracture.



Time of exam: 9:06 AM



Correlation is made with prior radiograph from 01/12/2020.



Obliquely oriented fracture of the distal 5th metatarsal is again

noted. The fracture line remains clearly visible. No significant

callus formation is seen. Alignment is stable. Remaining

metatarsals and phalanges are intact. Midfoot and hindfoot are

unremarkable.



IMPRESSION: No significant change in the obliquely oriented

distal 5th metatarsal fracture when compared with examination 10

days earlier.



Dictated by: 



  Dictated on workstation # [id number]

## 2020-11-09 ENCOUNTER — HOSPITAL ENCOUNTER (EMERGENCY)
Dept: HOSPITAL 75 - ER FS | Age: 20
Discharge: HOME | End: 2020-11-09
Payer: MEDICAID

## 2020-11-09 VITALS — DIASTOLIC BLOOD PRESSURE: 90 MMHG | SYSTOLIC BLOOD PRESSURE: 155 MMHG

## 2020-11-09 DIAGNOSIS — Z91.040: ICD-10-CM

## 2020-11-09 DIAGNOSIS — Z79.52: ICD-10-CM

## 2020-11-09 DIAGNOSIS — J45.909: ICD-10-CM

## 2020-11-09 DIAGNOSIS — O99.519: ICD-10-CM

## 2020-11-09 DIAGNOSIS — O20.9: Primary | ICD-10-CM

## 2020-11-09 DIAGNOSIS — Z3A.00: ICD-10-CM

## 2020-11-09 LAB
ALBUMIN SERPL-MCNC: 4.1 GM/DL (ref 3.2–4.5)
ALP SERPL-CCNC: 54 U/L (ref 40–136)
ALT SERPL-CCNC: 60 U/L (ref 0–55)
APTT PPP: YELLOW S
BACTERIA #/AREA URNS HPF: (no result) /HPF
BASOPHILS # BLD AUTO: 0 10^3/UL (ref 0–0.1)
BASOPHILS NFR BLD AUTO: 0 % (ref 0–10)
BILIRUB SERPL-MCNC: 0.6 MG/DL (ref 0.1–1)
BILIRUB UR QL STRIP: NEGATIVE
BUN/CREAT SERPL: 10
CALCIUM SERPL-MCNC: 9 MG/DL (ref 8.5–10.1)
CHLORIDE SERPL-SCNC: 107 MMOL/L (ref 98–107)
CO2 SERPL-SCNC: 20 MMOL/L (ref 21–32)
CREAT SERPL-MCNC: 0.58 MG/DL (ref 0.6–1.3)
EOSINOPHIL # BLD AUTO: 0.5 10^3/UL (ref 0–0.3)
EOSINOPHIL NFR BLD AUTO: 4 % (ref 0–10)
FIBRINOGEN PPP-MCNC: (no result) MG/DL
GFR SERPLBLD BASED ON 1.73 SQ M-ARVRAT: > 60 ML/MIN
GLUCOSE SERPL-MCNC: 105 MG/DL (ref 70–105)
GLUCOSE UR STRIP-MCNC: NEGATIVE MG/DL
HCT VFR BLD CALC: 40 % (ref 35–52)
HGB BLD-MCNC: 13.5 G/DL (ref 11.5–16)
KETONES UR QL STRIP: NEGATIVE
LEUKOCYTE ESTERASE UR QL STRIP: NEGATIVE
LYMPHOCYTES # BLD AUTO: 1.9 X 10^3 (ref 1–4)
LYMPHOCYTES NFR BLD AUTO: 17 % (ref 12–44)
MANUAL DIFFERENTIAL PERFORMED BLD QL: NO
MCH RBC QN AUTO: 31 PG (ref 25–34)
MCHC RBC AUTO-ENTMCNC: 34 G/DL (ref 32–36)
MCV RBC AUTO: 90 FL (ref 80–99)
MONOCYTES # BLD AUTO: 0.6 X 10^3 (ref 0–1)
MONOCYTES NFR BLD AUTO: 6 % (ref 0–12)
NEUTROPHILS # BLD AUTO: 8 X 10^3 (ref 1.8–7.8)
NEUTROPHILS NFR BLD AUTO: 72 % (ref 42–75)
NITRITE UR QL STRIP: NEGATIVE
PH UR STRIP: 6 [PH] (ref 5–9)
PLATELET # BLD: 280 10^3/UL (ref 130–400)
PMV BLD AUTO: 9.4 FL (ref 7.4–10.4)
POTASSIUM SERPL-SCNC: 3.8 MMOL/L (ref 3.6–5)
PROT SERPL-MCNC: 6.6 GM/DL (ref 6.4–8.2)
PROT UR QL STRIP: NEGATIVE
RBC #/AREA URNS HPF: (no result) /HPF
SODIUM SERPL-SCNC: 140 MMOL/L (ref 135–145)
SP GR UR STRIP: 1.01 (ref 1.02–1.02)
SQUAMOUS #/AREA URNS HPF: (no result) /HPF
WBC # BLD AUTO: 11.1 10^3/UL (ref 4.3–11)
WBC #/AREA URNS HPF: (no result) /HPF

## 2020-11-09 PROCEDURE — 81000 URINALYSIS NONAUTO W/SCOPE: CPT

## 2020-11-09 PROCEDURE — 99282 EMERGENCY DEPT VISIT SF MDM: CPT

## 2020-11-09 PROCEDURE — 84702 CHORIONIC GONADOTROPIN TEST: CPT

## 2020-11-09 PROCEDURE — 36415 COLL VENOUS BLD VENIPUNCTURE: CPT

## 2020-11-09 PROCEDURE — 80053 COMPREHEN METABOLIC PANEL: CPT

## 2020-11-09 PROCEDURE — 85025 COMPLETE CBC W/AUTO DIFF WBC: CPT

## 2020-11-09 NOTE — ED GENERAL
General


Stated Complaint:  SPOTTING


Source of Information:  Patient, RN/MD, RN Notes Reviewed


Exam Limitations:  No Limitations





History of Present Illness


Date Seen by Provider:  Nov 9, 2020


Time Seen by Provider:  21:45


Initial Comments


This patient is a 20-year-old female presents to the emergency department 

complaining of vaginal bleeding. Patient states that she just had a positive 

pregnancy test and had a beta hCG level drawn in the clinic that showed to 272 

is the level. Patient started having some cramping and some mild spotting. This 

afternoon. Patient denies any significant cramping. I did discuss length with 

patient about options patient is aware we do not have ultrasound available in 

the emergency department. Patient requests that we repeat beta hCG level to see 

her levels are. We'll have nurses draw labs.


Timing/Duration:  4-6 Hours


Severity:  Mild


Associated Systoms:  Denies Symptoms





Allergies and Home Medications


Allergies


Coded Allergies:  


     strawberry (Unverified  Allergy, Mild, 3/3/19)


     latex (Verified  Allergy, Unknown, RASH, 3/3/19)





Home Medications


Azithromycin 250 Mg Tablet, 250 MG PO DAILY


   Prescribed by: MADDIE PETER on 3/3/19 0105


Azithromycin 250 Mg Tablet, 250 MG PO UD


   TAKE 2 TABLETS ON DAY ONE THEN TAKE 1 TABLET DAILY FOR FOUR MORE DAYS 


   Prescribed by: JONEL ZAMORA on 12/7/19 1550


Baclofen 10 Mg Tablet, 10 MG PO TID PRN for MUSCLE SPASMS


   Prescribed by: ISABEL AG on 8/27/19 0207


Cyclobenzaprine HCl 5 Mg Tablet, 5 MG PO DAILY PRN for MUSCLE SPASMS


   Prescribed by: ADILIA CAVAZOS on 1/5/20 2316


Hydrocodone Bit/Acetaminophen 1 Tab Tab, 1 EACH PO Q6H PRN for PAIN-MODERATE


   Prescribed by: ISABEL AG on 8/27/19 0207


Hydrocodone Bit/Acetaminophen 1 Tab Tab, 1 EACH PO Q4-6HR PRN for PAIN-MODERATE


   Prescribed by: NOLBERTO BROWER on 1/17/20 2049


Hydrocodone/Acetaminophen 10 Ml Solution, 5 ML PO Q4H PRN for COUGH


   Prescribed by: MADDIE PETER on 3/3/19 0106


Ibuprofen 800 Mg Tablet, 800 MG PO Q8H PRN for PAIN


   Prescribed by: ISABEL AG on 8/27/19 0207


Ondansetron 4 Mg Tab.rapdis, 4 MG PO Q6H PRN for NAUSEA/VOMITING


   Prescribed by: MARIA E DAMON on 12/31/19 0111


Oseltamivir Phosphate 75 Mg Capsule, 1 CAP PO BID


   Prescribed by: ROBER BRAND on 8/25/09 0112


Prednisone 50 Mg Tab, 50 MG PO DAILY


   Prescribed by: MADDIE PETER on 3/3/19 0105


Sulfamethoxazole/Trimethoprim 1 Each Tablet, 1 EACH PO BID


   Prescribed by: LINDA PRITCHETT on 7/19/19 0018





Patient Home Medication List


Home Medication List Reviewed:  Yes





Review of Systems


Review of Systems


Constitutional:  No no symptoms reported, No see HPI, No chills, No diaphoresis,

No dizziness, No fever, No malaise, No weakness, No weight gain, No weight loss,

No other


EENTM:  No see HPI, No no symptoms reported, No ear discharge, No hearing loss, 

No ear pain, No blurred vision, No double vision, No eye pain, No tearing, No 

vision loss, No dental problems, No hoarseness, No mouth pain, No mouth 

swelling, No epistaxis, No nose congestion, No nose pain, No throat pain, No 

throat swelling, No other


Respiratory:  No no symptoms reported, No see HPI, No cough, No dyspnea on 

exertion, No hemoptysis, No orthopnea, No phlegm, No short of breath, No 

stridor, No wheezing, No other


Cardiovascular:  No no symptoms reported, No see HPI, No chest pain, No edema, 

No Hx of Intervention, No palpitations, No syncope, No vascular heart diseas, No

other


Gastrointestinal:  No RUQ, No LUQ, No RLQ, No LLQ, No no symptoms reported, No 

see HPI, No abdominal pain, No constipation, No diarrhea, No dysphagia, No 

hematemesis, No heartburn, No jaundice, No loss of appetite, No melena, No 

nausea, No vomiting, No other


Genitourinary:  see HPI


Pregnant:  Yes


LMP:  Sep 28, 2020


Skin:  No no symptoms reported, No see HPI, No change in color, No change in 

hair/nails, No dryness, No hx of skin cancer, No lesions, No lumps, No pruritus,

No rash, No other





All Other Systems Reviewed


Negative Unless Noted:  Yes





Past Medical-Social-Family Hx


Patient Social History


Type Used:  Cigarettes


2nd Hand Smoke Exposure:  No


Recent Foreign Travel:  No


Contact w/Someone Who Travel:  No


Recent Hopitalizations:  No





Immunizations Up To Date


Tetanus Booster (TDap):  Unknown





Seasonal Allergies


Seasonal Allergies:  No





Past Medical History


Surgeries:  Yes


Adenoidectomy, Gallbladder, Tonsillectomy


Respiratory:  Yes


Asthma


Cardiac:  No


Neurological:  No


Genitourinary:  No


Gastrointestinal:  No


Musculoskeletal:  No


Endocrine:  No


HEENT:  No


Cancer:  No


Psychosocial:  Yes


Anxiety, Depression


Integumentary:  No


Blood Disorders:  No





Physical Exam


Vital Signs





Vital Signs - First Documented








 11/9/20





 21:44


 


Temp 36.6


 


Pulse 126


 


Resp 18


 


B/P (MAP) 161/96 (117)


 


Pulse Ox 98


 


O2 Delivery Room Air





Capillary Refill :


Height, Weight, BMI


Height: 5'5.50"


Weight: 334lbs. 0oz. 151.066734fh; 54.00 BMI


Method:Stated


General Appearance:  No Apparent Distress, WD/WN


Cardiovascular:  Regular Rate, Rhythm, No Edema, No Gallop, No JVD, No Murmur, 

Normal Peripheral Pulses


Gastrointestinal:  Normal Bowel Sounds, No Organomegaly, No Pulsatile Mass, Non 

Tender, Soft


Neurologic/Psychiatric:  Alert, Oriented x3, No Motor/Sensory Deficits, Normal 

Mood/Affect





Progress/Results/Core Measures


Suspected Sepsis


SIRS


Temperature: 


Pulse:  


Respiratory Rate: 


 


Laboratory Tests


11/9/20 21:55: White Blood Count 11.1H


Blood Pressure  / 


Mean: 


 











Laboratory Tests


11/9/20 21:55: 


Creatinine 0.58L, Platelet Count 280, Total Bilirubin 0.6





Results/Orders


Lab Results





Laboratory Tests








Test


 11/9/20


21:48 11/9/20


21:55 Range/Units


 


 


Urine Color YELLOW    


 


Urine Clarity SLT CLOUDY    


 


Urine pH 6.0   5-9  


 


Urine Specific Gravity 1.010 L  1.016-1.022  


 


Urine Protein NEGATIVE   NEGATIVE  


 


Urine Glucose (UA) NEGATIVE   NEGATIVE  


 


Urine Ketones NEGATIVE   NEGATIVE  


 


Urine Nitrite NEGATIVE   NEGATIVE  


 


Urine Bilirubin NEGATIVE   NEGATIVE  


 


Urine Urobilinogen 0.2   < = 1.0  MG/DL


 


Urine Leukocyte Esterase NEGATIVE   NEGATIVE  


 


Urine RBC (Auto) 2+ H  NEGATIVE  


 


Urine RBC NONE    /HPF


 


Urine WBC RARE    /HPF


 


Urine Squamous Epithelial


Cells 5-10 


 


  /HPF





 


Urine Crystals NONE    /LPF


 


Urine Bacteria TRACE    /HPF


 


Urine Casts NONE    /LPF


 


Urine Mucus NEGATIVE    /LPF


 


Urine Culture Indicated NO    


 


White Blood Count


 


 11.1 H


 4.3-11.0


10^3/uL


 


Red Blood Count


 


 4.42 


 4.35-5.85


10^6/uL


 


Hemoglobin  13.5  11.5-16.0  G/DL


 


Hematocrit  40  35-52  %


 


Mean Corpuscular Volume  90  80-99  FL


 


Mean Corpuscular Hemoglobin  31  25-34  PG


 


Mean Corpuscular Hemoglobin


Concent 


 34 


 32-36  G/DL





 


Red Cell Distribution Width  13.0  10.0-14.5  %


 


Platelet Count


 


 280 


 130-400


10^3/uL


 


Mean Platelet Volume  9.4  7.4-10.4  FL


 


Immature Granulocyte % (Auto)  0   %


 


Neutrophils (%) (Auto)  72  42-75  %


 


Lymphocytes (%) (Auto)  17  12-44  %


 


Monocytes (%) (Auto)  6  0-12  %


 


Eosinophils (%) (Auto)  4  0-10  %


 


Basophils (%) (Auto)  0  0-10  %


 


Neutrophils # (Auto)  8.0 H 1.8-7.8  X 10^3


 


Lymphocytes # (Auto)  1.9  1.0-4.0  X 10^3


 


Monocytes # (Auto)  0.6  0.0-1.0  X 10^3


 


Eosinophils # (Auto)


 


 0.5 H


 0.0-0.3


10^3/uL


 


Basophils # (Auto)


 


 0.0 


 0.0-0.1


10^3/uL


 


Immature Granulocyte # (Auto)


 


 0.0 


 0.0-0.1


10^3/uL


 


Sodium Level  140  135-145  MMOL/L


 


Potassium Level  3.8  3.6-5.0  MMOL/L


 


Chloride Level  107    MMOL/L


 


Carbon Dioxide Level  20 L 21-32  MMOL/L


 


Anion Gap  13  5-14  MMOL/L


 


Blood Urea Nitrogen  6 L 7-18  MG/DL


 


Creatinine


 


 0.58 L


 0.60-1.30


MG/DL


 


Estimat Glomerular Filtration


Rate 


 > 60 


  





 


BUN/Creatinine Ratio  10   


 


Glucose Level  105    MG/DL


 


Calcium Level  9.0  8.5-10.1  MG/DL


 


Corrected Calcium  8.9  8.5-10.1  MG/DL


 


Total Bilirubin  0.6  0.1-1.0  MG/DL


 


Aspartate Amino Transf


(AST/SGOT) 


 29 


 5-34  U/L





 


Alanine Aminotransferase


(ALT/SGPT) 


 60 H


 0-55  U/L





 


Alkaline Phosphatase  54    U/L


 


Total Protein  6.6  6.4-8.2  GM/DL


 


Albumin  4.1  3.2-4.5  GM/DL


 


Human Chorionic Gonadotropin,


Quant 


 3990 H


 <5  MIU/ML











My Orders





Orders - MADDIE MANZANO MD


Urinalysis (11/9/20 21:51)


Cbc With Automated Diff (11/9/20 21:51)


Comprehensive Metabolic Panel (11/9/20 21:51)


Hcg,Quantitative (11/9/20 21:51)





Vital Signs/I&O











 11/9/20





 21:44


 


Temp 36.6


 


Pulse 126


 


Resp 18


 


B/P (MAP) 161/96 (117)


 


Pulse Ox 98


 


O2 Delivery Room Air





Capillary Refill :


Progress Note :  


   Time:  22:46


Progress Note


Beta hCG level is greater than 3900. I did discuss at length with patient about 

unavailability of ultrasound. Patient's instructed follow-up with her OB/GYN in 

the next 1-2 days for further evaluation and treatment. May also present to 

either [facility] or [facility] that she is concerned that she 

needs an ultrasound. Patient understands that due to early nature of possible 

pregnancy difficult to say if pregnancy is viable or this is just vaginal 

bleeding in early pregnancy. Patient given instructions she'll be discharged nilson

e. Patient states she understands risk and concerns.





Departure


Impression





   Primary Impression:  


   Vaginal bleeding affecting early pregnancy


Disposition:  01 HOME, SELF-CARE


Condition:  Stable





Departure-Patient Inst.


Decision time for Depature:  22:47


Referrals:  


CHIDI KUMAR MD (PCP)


Primary Care Physician








STEPHANIE MAGANA MD (Family)


Primary Care Physician


Patient Instructions:  Bleeding With Pregnancy (DC)





Add. Discharge Instructions:  


Avoid Motrin or other nonsteroidal pain medications. Encourage by mouth fluids. 

Monitor bleeding closely. Follow-up with your OB/GYN and/or present to [facility] or [facility] for possible OB/GYN evaluation

via ultrasound.











MADDIE MANZANO MD             Nov 9, 2020 21:55

## 2020-11-12 ENCOUNTER — HOSPITAL ENCOUNTER (OUTPATIENT)
Dept: HOSPITAL 75 - LAB FS | Age: 20
End: 2020-11-12
Attending: FAMILY MEDICINE
Payer: MEDICAID

## 2020-11-12 DIAGNOSIS — N92.6: Primary | ICD-10-CM

## 2020-11-12 PROCEDURE — 84702 CHORIONIC GONADOTROPIN TEST: CPT

## 2020-11-12 PROCEDURE — 36415 COLL VENOUS BLD VENIPUNCTURE: CPT

## 2020-12-09 ENCOUNTER — HOSPITAL ENCOUNTER (OUTPATIENT)
Dept: HOSPITAL 75 - LAB FS | Age: 20
End: 2020-12-09
Attending: FAMILY MEDICINE
Payer: MEDICAID

## 2020-12-09 DIAGNOSIS — Z34.91: Primary | ICD-10-CM

## 2020-12-09 DIAGNOSIS — Z3A.00: ICD-10-CM

## 2020-12-09 LAB
BASOPHILS # BLD AUTO: 0.1 10^3/UL (ref 0–0.1)
BASOPHILS NFR BLD AUTO: 1 % (ref 0–10)
EOSINOPHIL # BLD AUTO: 0.4 10^3/UL (ref 0–0.3)
EOSINOPHIL NFR BLD AUTO: 4 % (ref 0–10)
HCT VFR BLD CALC: 39 % (ref 35–52)
HGB BLD-MCNC: 13.3 G/DL (ref 11.5–16)
LYMPHOCYTES # BLD AUTO: 1.8 X 10^3 (ref 1–4)
LYMPHOCYTES NFR BLD AUTO: 18 % (ref 12–44)
MCH RBC QN AUTO: 30 PG (ref 25–34)
MCHC RBC AUTO-ENTMCNC: 34 G/DL (ref 32–36)
MCV RBC AUTO: 88 FL (ref 80–99)
MONOCYTES # BLD AUTO: 0.6 X 10^3 (ref 0–1)
MONOCYTES NFR BLD AUTO: 5 % (ref 0–12)
NEUTROPHILS # BLD AUTO: 7.5 X 10^3 (ref 1.8–7.8)
NEUTROPHILS NFR BLD AUTO: 73 % (ref 42–75)
PLATELET # BLD: 263 10^3/UL (ref 130–400)
PMV BLD AUTO: 9.4 FL (ref 7.4–10.4)
WBC # BLD AUTO: 10.3 10^3/UL (ref 4.3–11)

## 2020-12-09 PROCEDURE — 86803 HEPATITIS C AB TEST: CPT

## 2020-12-09 PROCEDURE — 86762 RUBELLA ANTIBODY: CPT

## 2020-12-09 PROCEDURE — 86703 HIV-1/HIV-2 1 RESULT ANTBDY: CPT

## 2020-12-09 PROCEDURE — 87088 URINE BACTERIA CULTURE: CPT

## 2020-12-09 PROCEDURE — 36415 COLL VENOUS BLD VENIPUNCTURE: CPT

## 2021-02-03 ENCOUNTER — HOSPITAL ENCOUNTER (OUTPATIENT)
Dept: HOSPITAL 75 - LAB FS | Age: 21
End: 2021-02-03
Attending: FAMILY MEDICINE
Payer: MEDICAID

## 2021-02-03 DIAGNOSIS — O09.92: Primary | ICD-10-CM

## 2021-02-03 DIAGNOSIS — Z3A.00: ICD-10-CM

## 2021-02-03 PROCEDURE — 82677 ASSAY OF ESTRIOL: CPT

## 2021-02-03 PROCEDURE — 84702 CHORIONIC GONADOTROPIN TEST: CPT

## 2021-02-03 PROCEDURE — 36415 COLL VENOUS BLD VENIPUNCTURE: CPT

## 2021-02-03 PROCEDURE — 86336 INHIBIN A: CPT

## 2021-02-03 PROCEDURE — 82105 ALPHA-FETOPROTEIN SERUM: CPT

## 2021-02-22 ENCOUNTER — HOSPITAL ENCOUNTER (OUTPATIENT)
Dept: HOSPITAL 75 - RAD FS | Age: 21
End: 2021-02-22
Attending: FAMILY MEDICINE
Payer: MEDICAID

## 2021-02-22 DIAGNOSIS — O09.92: Primary | ICD-10-CM

## 2021-02-22 DIAGNOSIS — Z3A.20: ICD-10-CM

## 2021-02-22 PROCEDURE — 76805 OB US >/= 14 WKS SNGL FETUS: CPT

## 2021-02-22 NOTE — DIAGNOSTIC IMAGING REPORT
INDICATION: Fetal survey.



TECHNIQUE: Multiple real-time grayscale images were obtained over

the gravid uterus.



COMPARISON: None.



FINDINGS: There is a single live fetus in a cephalic

presentation. Fetal heart rate was recorded at 147 BPM. Placenta

is posterior. No previa is identified. Fetal survey demonstrated

fetal bladder and stomach to be unremarkable. The kidneys were

suboptimally evaluated due to patient body habitus. The fetal

brain is unremarkable. There is a four-chamber heart. There is a

three-vessel cord with normal insertion. Fetal spine is

unremarkable.



Biometrical measurements are as follows:

Biparietal 5.13 cm, age 21 weeks 4 days.

Head circumference 17.49 cm, age 20 weeks 2 days.

Abdominal circumference 15.51 cm, age 20 weeks 5 days.

Femur length 3.38 cm, age 20 weeks 5 days.



Sonographic estimate age: 20 weeks 6 days.

Sonographic estimated date of delivery: 07/06/2021.



Estimated Fetal Weight: 368 gm (+/- 54  gm).

LMP percentile: 34%.



Fetal heart rate: 147 beats per minute.



Fetal number: 1 of 1.



IMPRESSION: Single live IUP of 20 weeks 6 days gestational age.

Estimated date of confinement sonographically is 07/06/2021.



Dictated by: 



  Dictated on workstation # [id number]

## 2021-04-06 ENCOUNTER — HOSPITAL ENCOUNTER (OUTPATIENT)
Dept: HOSPITAL 75 - WSO | Age: 21
Discharge: HOME | End: 2021-04-06
Attending: OBSTETRICS & GYNECOLOGY
Payer: MEDICAID

## 2021-04-06 VITALS — SYSTOLIC BLOOD PRESSURE: 127 MMHG | DIASTOLIC BLOOD PRESSURE: 60 MMHG

## 2021-04-06 VITALS — DIASTOLIC BLOOD PRESSURE: 60 MMHG | SYSTOLIC BLOOD PRESSURE: 127 MMHG

## 2021-04-06 VITALS — BODY MASS INDEX: 47.09 KG/M2 | HEIGHT: 66.02 IN | WEIGHT: 293 LBS

## 2021-04-06 DIAGNOSIS — O34.62: Primary | ICD-10-CM

## 2021-04-06 DIAGNOSIS — Z3A.25: ICD-10-CM

## 2021-04-06 DIAGNOSIS — O36.8120: ICD-10-CM

## 2021-04-06 LAB
AMORPH SED URNS QL MICRO: (no result) /LPF
APTT PPP: YELLOW S
BACTERIA #/AREA URNS HPF: NEGATIVE /HPF
BILIRUB UR QL STRIP: NEGATIVE
FIBRINOGEN PPP-MCNC: CLEAR MG/DL
GLUCOSE UR STRIP-MCNC: NEGATIVE MG/DL
KETONES UR QL STRIP: NEGATIVE
LEUKOCYTE ESTERASE UR QL STRIP: NEGATIVE
NITRITE UR QL STRIP: NEGATIVE
PH UR STRIP: 6.5 [PH] (ref 5–9)
PROT UR QL STRIP: NEGATIVE
RBC #/AREA URNS HPF: (no result) /HPF
SP GR UR STRIP: <=1.005 (ref 1.02–1.02)
WBC #/AREA URNS HPF: (no result) /HPF

## 2021-04-06 PROCEDURE — 87088 URINE BACTERIA CULTURE: CPT

## 2021-04-06 PROCEDURE — 99212 OFFICE O/P EST SF 10 MIN: CPT

## 2021-04-06 PROCEDURE — 81000 URINALYSIS NONAUTO W/SCOPE: CPT

## 2021-04-07 NOTE — PHYSICIAN QUERY-FINAL DX
SNOU COHEN 4/7/21 1111:


Clinic Account Progress/Dx


Physician Query:


Please give diagnosis





Please include # weeks gestation


Date of Service





Apr 6, 2021 at 20:31





GURWINDER MUÑOZ DO 4/7/21 2134:


Clinic Account Progress/Dx


DIAGNOSIS:


Diagnosis


decreased fetal movement


25 week gestation











SONU COHEN                     Apr 7, 2021 11:11


GURWINDER MUÑOZ DO                Apr 7, 2021 21:34

## 2021-04-11 ENCOUNTER — HOSPITAL ENCOUNTER (OUTPATIENT)
Dept: HOSPITAL 75 - LDRP | Age: 21
Discharge: HOME | End: 2021-04-11
Attending: FAMILY MEDICINE
Payer: MEDICAID

## 2021-04-11 VITALS — SYSTOLIC BLOOD PRESSURE: 124 MMHG | DIASTOLIC BLOOD PRESSURE: 75 MMHG

## 2021-04-11 VITALS — DIASTOLIC BLOOD PRESSURE: 75 MMHG | SYSTOLIC BLOOD PRESSURE: 124 MMHG

## 2021-04-11 VITALS — WEIGHT: 293 LBS | BODY MASS INDEX: 47.09 KG/M2 | HEIGHT: 66.02 IN

## 2021-04-11 DIAGNOSIS — O26.899: Primary | ICD-10-CM

## 2021-04-11 DIAGNOSIS — Z3A.00: ICD-10-CM

## 2021-04-12 NOTE — PHYSICIAN QUERY-FINAL DX
Clinic Account Progress/Dx


Physician Query:


Please give diagnosis





Please include # weeks gestation


Date of Service





Apr 11, 2021 at 16:47











SONU COHEN                    Apr 12, 2021 07:34

## 2021-04-19 ENCOUNTER — HOSPITAL ENCOUNTER (OUTPATIENT)
Dept: HOSPITAL 75 - LAB FS | Age: 21
End: 2021-04-19
Attending: FAMILY MEDICINE
Payer: MEDICAID

## 2021-04-19 DIAGNOSIS — Z34.02: Primary | ICD-10-CM

## 2021-04-19 DIAGNOSIS — Z3A.00: ICD-10-CM

## 2021-04-19 LAB
HCT VFR BLD CALC: 31 % (ref 35–52)
HGB BLD-MCNC: 10.8 G/DL (ref 11.5–16)
MCH RBC QN AUTO: 31 PG (ref 25–34)
MCHC RBC AUTO-ENTMCNC: 35 G/DL (ref 32–36)
MCV RBC AUTO: 89 FL (ref 80–99)
PLATELET # BLD: 237 10^3/UL (ref 130–400)
PMV BLD AUTO: 9 FL (ref 7.4–10.4)
WBC # BLD AUTO: 12.7 10^3/UL (ref 4.3–11)

## 2021-04-19 PROCEDURE — 85027 COMPLETE CBC AUTOMATED: CPT

## 2021-04-19 PROCEDURE — 86592 SYPHILIS TEST NON-TREP QUAL: CPT

## 2021-04-19 PROCEDURE — 82950 GLUCOSE TEST: CPT

## 2021-04-19 PROCEDURE — 36415 COLL VENOUS BLD VENIPUNCTURE: CPT

## 2021-05-03 ENCOUNTER — HOSPITAL ENCOUNTER (OUTPATIENT)
Dept: HOSPITAL 75 - RAD FS | Age: 21
End: 2021-05-03
Attending: FAMILY MEDICINE
Payer: MEDICAID

## 2021-05-03 DIAGNOSIS — O35.8XX9: ICD-10-CM

## 2021-05-03 DIAGNOSIS — O36.63X0: Primary | ICD-10-CM

## 2021-05-03 DIAGNOSIS — Z3A.33: ICD-10-CM

## 2021-05-03 PROCEDURE — 76805 OB US >/= 14 WKS SNGL FETUS: CPT

## 2021-06-16 ENCOUNTER — HOSPITAL ENCOUNTER (OUTPATIENT)
Dept: HOSPITAL 75 - WSO | Age: 21
Discharge: HOME | End: 2021-06-16
Attending: OBSTETRICS & GYNECOLOGY
Payer: MEDICAID

## 2021-06-16 VITALS — DIASTOLIC BLOOD PRESSURE: 70 MMHG | SYSTOLIC BLOOD PRESSURE: 124 MMHG

## 2021-06-16 VITALS — HEIGHT: 66.02 IN | BODY MASS INDEX: 47.09 KG/M2 | WEIGHT: 293 LBS

## 2021-06-16 VITALS — SYSTOLIC BLOOD PRESSURE: 124 MMHG | DIASTOLIC BLOOD PRESSURE: 70 MMHG

## 2021-06-16 DIAGNOSIS — O26.893: Primary | ICD-10-CM

## 2021-06-16 DIAGNOSIS — M54.9: ICD-10-CM

## 2021-06-16 DIAGNOSIS — Z3A.37: ICD-10-CM

## 2021-06-16 LAB
APTT PPP: (no result) S
BACTERIA #/AREA URNS HPF: (no result) /HPF
BILIRUB UR QL STRIP: NEGATIVE
FIBRINOGEN PPP-MCNC: CLEAR MG/DL
GLUCOSE UR STRIP-MCNC: NEGATIVE MG/DL
KETONES UR QL STRIP: (no result)
LEUKOCYTE ESTERASE UR QL STRIP: (no result)
NITRITE UR QL STRIP: NEGATIVE
PH UR STRIP: 6.5 [PH] (ref 5–9)
PROT UR QL STRIP: (no result)
RBC #/AREA URNS HPF: (no result) /HPF
SP GR UR STRIP: 1.02 (ref 1.02–1.02)
SQUAMOUS #/AREA URNS HPF: (no result) /HPF
WBC #/AREA URNS HPF: (no result) /HPF

## 2021-06-16 PROCEDURE — 81000 URINALYSIS NONAUTO W/SCOPE: CPT

## 2021-06-16 PROCEDURE — 99214 OFFICE O/P EST MOD 30 MIN: CPT

## 2021-06-16 PROCEDURE — 87088 URINE BACTERIA CULTURE: CPT

## 2021-06-17 NOTE — PHYSICIAN QUERY-FINAL DX
SONU COHEN 6/17/21 0745:


Clinic Account Progress/Dx


Physician Query:


Please give diagnosis





Please include # weeks gestation


Date of Service





Jun 16, 2021 at 18:40





SKYLER MONTEJO DO 6/18/21 0706:


Clinic Account Progress/Dx


DIAGNOSIS:


Diagnosis


37 week IUP with decreased fetal movement


Uncomplicated UTI


Pelvic pains











SONU COHEN                    Jun 17, 2021 07:45


SKYLER MONTEJO DO            Jun 18, 2021 07:06

## 2021-06-26 ENCOUNTER — HOSPITAL ENCOUNTER (OUTPATIENT)
Dept: HOSPITAL 75 - WSO | Age: 21
Discharge: HOME | End: 2021-06-26
Attending: OBSTETRICS & GYNECOLOGY
Payer: MEDICAID

## 2021-06-26 VITALS — BODY MASS INDEX: 47.09 KG/M2 | HEIGHT: 66.02 IN | WEIGHT: 293 LBS

## 2021-06-26 VITALS — SYSTOLIC BLOOD PRESSURE: 106 MMHG | DIASTOLIC BLOOD PRESSURE: 57 MMHG

## 2021-06-26 DIAGNOSIS — O26.899: Primary | ICD-10-CM

## 2021-06-26 DIAGNOSIS — Z3A.00: ICD-10-CM

## 2021-06-26 PROCEDURE — 99213 OFFICE O/P EST LOW 20 MIN: CPT

## 2021-06-28 ENCOUNTER — HOSPITAL ENCOUNTER (INPATIENT)
Dept: HOSPITAL 75 - LDRP | Age: 21
LOS: 3 days | Discharge: HOME | End: 2021-07-01
Attending: OBSTETRICS & GYNECOLOGY | Admitting: OBSTETRICS & GYNECOLOGY
Payer: MEDICAID

## 2021-06-28 VITALS — BODY MASS INDEX: 47.09 KG/M2 | WEIGHT: 293 LBS | HEIGHT: 66.02 IN

## 2021-06-28 VITALS — DIASTOLIC BLOOD PRESSURE: 40 MMHG | SYSTOLIC BLOOD PRESSURE: 113 MMHG

## 2021-06-28 DIAGNOSIS — E66.01: ICD-10-CM

## 2021-06-28 DIAGNOSIS — Z3A.38: ICD-10-CM

## 2021-06-28 LAB
APTT PPP: YELLOW S
BACTERIA #/AREA URNS HPF: (no result) /HPF
BASOPHILS # BLD AUTO: 0.1 10^3/UL (ref 0–0.1)
BASOPHILS NFR BLD AUTO: 0 % (ref 0–10)
BILIRUB UR QL STRIP: (no result)
CAOX CRY #/AREA URNS LPF: (no result) /LPF
EOSINOPHIL # BLD AUTO: 0.1 10^3/UL (ref 0–0.3)
EOSINOPHIL NFR BLD AUTO: 1 % (ref 0–10)
FIBRINOGEN PPP-MCNC: CLEAR MG/DL
GLUCOSE UR STRIP-MCNC: NEGATIVE MG/DL
HCT VFR BLD CALC: 37 % (ref 35–52)
HGB BLD-MCNC: 12.3 G/DL (ref 11.5–16)
KETONES UR QL STRIP: (no result)
LEUKOCYTE ESTERASE UR QL STRIP: NEGATIVE
LYMPHOCYTES # BLD AUTO: 1.7 10^3/UL (ref 1–4)
LYMPHOCYTES NFR BLD AUTO: 12 % (ref 12–44)
MANUAL DIFFERENTIAL PERFORMED BLD QL: NO
MCH RBC QN AUTO: 30 PG (ref 25–34)
MCHC RBC AUTO-ENTMCNC: 34 G/DL (ref 32–36)
MCV RBC AUTO: 90 FL (ref 80–99)
MONOCYTES # BLD AUTO: 0.8 10^3/UL (ref 0–1)
MONOCYTES NFR BLD AUTO: 6 % (ref 0–12)
NEUTROPHILS # BLD AUTO: 11.5 10^3/UL (ref 1.8–7.8)
NEUTROPHILS NFR BLD AUTO: 80 % (ref 42–75)
NITRITE UR QL STRIP: NEGATIVE
PH UR STRIP: 6 [PH] (ref 5–9)
PLATELET # BLD: 235 10^3/UL (ref 130–400)
PMV BLD AUTO: 9.8 FL (ref 9–12.2)
PROT UR QL STRIP: (no result)
RBC #/AREA URNS HPF: (no result) /HPF
SP GR UR STRIP: 1.02 (ref 1.02–1.02)
WBC # BLD AUTO: 14.3 10^3/UL (ref 4.3–11)
WBC #/AREA URNS HPF: (no result) /HPF

## 2021-06-28 PROCEDURE — 85025 COMPLETE CBC W/AUTO DIFF WBC: CPT

## 2021-06-28 PROCEDURE — 86850 RBC ANTIBODY SCREEN: CPT

## 2021-06-28 PROCEDURE — 86900 BLOOD TYPING SEROLOGIC ABO: CPT

## 2021-06-28 PROCEDURE — 81000 URINALYSIS NONAUTO W/SCOPE: CPT

## 2021-06-28 PROCEDURE — 87088 URINE BACTERIA CULTURE: CPT

## 2021-06-28 PROCEDURE — 36415 COLL VENOUS BLD VENIPUNCTURE: CPT

## 2021-06-28 PROCEDURE — 86901 BLOOD TYPING SEROLOGIC RH(D): CPT

## 2021-06-28 NOTE — HISTORY & PHYSICAL-OB
OB - Chief Complaint & HPI


Date/Time


Date of Admission:


Date of Admission:  2021 at 20:21


Date seen by a Provider:  2021


Time Seen by a Provider:  21:30





Chief Complaint/History


OB-Reason for Admission/Chief:  Induction of Labor


Hx :  1


Hx Para:  0


Expected Date of Delivery:  2021


Gestational Age in Weeks:  38


Gestational Age in Days:  6


Indication for induction:  maternal distance, other (social; morbid obesity)


Other reason for admission:


This is a patient that has been managed by Dr. Jay who presented to my office 

for ob visit


Admission Nurse Assessment Rev:  Yes


History of Labs


A+/-


Rub I


VDRL NR


HbSAg-


GBS -


Hep C -





Allergies and Home Medications


Allergies


Coded Allergies:  


     strawberry (Unverified  Allergy, Mild, 21)


     latex (Verified  Allergy, Unknown, RASH, 21)





Home Medications


Cephalexin 500 Mg Tablet, 500 MG PO QID


   Prescribed by: BRIANNA KISER on 21


Tig227/FA/Omega3/Dha/Fish Oil 1 Each Tab.chew, 1 EACH PO DAILY, (Reported)





Patient Home Medication List


Home Medication List Reviewed:  Yes





OB - History


Hx of Present Pregnancy


Ultrasounds:  Normal mid trimester US


Obstetrical Complications:  None


Medical Complications:  None





Prenatal Information


Pre-Hospital Medication Admins:  Recently treated with cephalexin for UTI


Pregnancy Induced Hypertension:  No


Maternal Gestational Diabetes:  No


Postpartum Hemorrhage:  No





Obstetrical History


Hx :  1


Hx Para:  0


Hx # Term Pregnancies:  0


Hx #  Pregnancies:  0


Number of Living Children:  0


Hx Pregnancy Termination:  No


Hx Multiple Gestation:  No


Hx Ectopic Pregnancy:  No


Hx Stillbirth:  No


Hx Pregnancy Complication:  No


Hx Pregnancy Induced Hypertens:  No


Hx Maternal Gestational Diabet:  No


Hx Postpartum Hemorrhage:  No





Patient Past Medical History





NC





Social History/Family History


Alcohol Use:  Denies Use


Recreational Drug Use:  No


Smoking Cessation:  Never smoker


2nd Hand Smoke Exposure:  No





Immunizations


Tetanus Booster (TDap):  Less than 5yrs (2021)


Rubella:  immune


RPR/VDRL:  Negative


GBS Status:  Negative





OB - Admission Exam


Physical Exam


Vitals:


129/83  36.1  P 85


Heart:  Rhythm Normal


Lungs:  Clear


Abdomen:  Gravid


Extremities:  Edema


Reflexes:  Normal


Cervical Dilatation:  Fingertip


Effacement:  25%


Station:  -3


Membranes:  Intact


Fetal Heart Rate:  130's


Accelerations:  Accelerations Present


Decelerations:  No Decelerations


Short Term Variability:  Present


Long Term Variability:  Minimal (3-5)


Contractions on Admission:  >10 Minutes Apart





OB - Assessment/Plan/Diagnosis


Assessment


Assessment:  induction of labor


Admission Dx


Induction of labor at 39 weeks


social induction


morbid obesity


Admission Status:  Inpatient Order (span 2 midnights)


Reason for Inpatient Admission:  


Induction


LAbor





Plan


Plan:  Induction


Induction Method:  per Misoprostol Protocol (Admit for misoprostol cervical 

ripening.)











GURWINDER MUÑOZ DO               2021 21:38

## 2021-06-28 NOTE — PHYSICIAN QUERY-FINAL DX
SONU COHEN 6/28/21 0800:


Clinic Account Progress/Dx


Physician Query:


Please give diagnosis





Please include # weeks gestation


Date of Service





Jun 26, 2021 at 00:08





DAISHA HOPKINS MD 6/30/21 1802:


Clinic Account Progress/Dx


DIAGNOSIS:


Diagnosis


38 weeks with false labor











SONU COHEN                    Jun 28, 2021 08:00


DAISHA HOPKINS MD       Jun 30, 2021 18:02

## 2021-06-29 VITALS — SYSTOLIC BLOOD PRESSURE: 108 MMHG | DIASTOLIC BLOOD PRESSURE: 58 MMHG

## 2021-06-29 VITALS — DIASTOLIC BLOOD PRESSURE: 76 MMHG | SYSTOLIC BLOOD PRESSURE: 132 MMHG

## 2021-06-29 VITALS — SYSTOLIC BLOOD PRESSURE: 135 MMHG | DIASTOLIC BLOOD PRESSURE: 88 MMHG

## 2021-06-29 VITALS — SYSTOLIC BLOOD PRESSURE: 110 MMHG | DIASTOLIC BLOOD PRESSURE: 62 MMHG

## 2021-06-29 VITALS — DIASTOLIC BLOOD PRESSURE: 66 MMHG | SYSTOLIC BLOOD PRESSURE: 122 MMHG

## 2021-06-29 VITALS — DIASTOLIC BLOOD PRESSURE: 64 MMHG | SYSTOLIC BLOOD PRESSURE: 125 MMHG

## 2021-06-29 VITALS — DIASTOLIC BLOOD PRESSURE: 80 MMHG | SYSTOLIC BLOOD PRESSURE: 118 MMHG

## 2021-06-29 VITALS — DIASTOLIC BLOOD PRESSURE: 70 MMHG | SYSTOLIC BLOOD PRESSURE: 132 MMHG

## 2021-06-29 VITALS — SYSTOLIC BLOOD PRESSURE: 145 MMHG | DIASTOLIC BLOOD PRESSURE: 71 MMHG

## 2021-06-29 VITALS — DIASTOLIC BLOOD PRESSURE: 62 MMHG | SYSTOLIC BLOOD PRESSURE: 103 MMHG

## 2021-06-29 VITALS — SYSTOLIC BLOOD PRESSURE: 131 MMHG | DIASTOLIC BLOOD PRESSURE: 74 MMHG

## 2021-06-29 VITALS — SYSTOLIC BLOOD PRESSURE: 117 MMHG | DIASTOLIC BLOOD PRESSURE: 56 MMHG

## 2021-06-29 VITALS — SYSTOLIC BLOOD PRESSURE: 118 MMHG | DIASTOLIC BLOOD PRESSURE: 80 MMHG

## 2021-06-29 VITALS — DIASTOLIC BLOOD PRESSURE: 56 MMHG | SYSTOLIC BLOOD PRESSURE: 109 MMHG

## 2021-06-29 VITALS — DIASTOLIC BLOOD PRESSURE: 60 MMHG | SYSTOLIC BLOOD PRESSURE: 119 MMHG

## 2021-06-29 VITALS — DIASTOLIC BLOOD PRESSURE: 73 MMHG | SYSTOLIC BLOOD PRESSURE: 106 MMHG

## 2021-06-29 VITALS — DIASTOLIC BLOOD PRESSURE: 56 MMHG | SYSTOLIC BLOOD PRESSURE: 101 MMHG

## 2021-06-29 VITALS — SYSTOLIC BLOOD PRESSURE: 130 MMHG | DIASTOLIC BLOOD PRESSURE: 64 MMHG

## 2021-06-29 VITALS — SYSTOLIC BLOOD PRESSURE: 116 MMHG | DIASTOLIC BLOOD PRESSURE: 63 MMHG

## 2021-06-29 VITALS — SYSTOLIC BLOOD PRESSURE: 121 MMHG | DIASTOLIC BLOOD PRESSURE: 80 MMHG

## 2021-06-29 VITALS — SYSTOLIC BLOOD PRESSURE: 131 MMHG | DIASTOLIC BLOOD PRESSURE: 80 MMHG

## 2021-06-29 VITALS — DIASTOLIC BLOOD PRESSURE: 57 MMHG | SYSTOLIC BLOOD PRESSURE: 107 MMHG

## 2021-06-29 VITALS — SYSTOLIC BLOOD PRESSURE: 114 MMHG | DIASTOLIC BLOOD PRESSURE: 53 MMHG

## 2021-06-29 VITALS — SYSTOLIC BLOOD PRESSURE: 99 MMHG | DIASTOLIC BLOOD PRESSURE: 54 MMHG

## 2021-06-29 VITALS — DIASTOLIC BLOOD PRESSURE: 57 MMHG | SYSTOLIC BLOOD PRESSURE: 127 MMHG

## 2021-06-29 VITALS — SYSTOLIC BLOOD PRESSURE: 118 MMHG | DIASTOLIC BLOOD PRESSURE: 69 MMHG

## 2021-06-29 VITALS — DIASTOLIC BLOOD PRESSURE: 78 MMHG | SYSTOLIC BLOOD PRESSURE: 139 MMHG

## 2021-06-29 VITALS — DIASTOLIC BLOOD PRESSURE: 82 MMHG | SYSTOLIC BLOOD PRESSURE: 136 MMHG

## 2021-06-29 VITALS — DIASTOLIC BLOOD PRESSURE: 58 MMHG | SYSTOLIC BLOOD PRESSURE: 116 MMHG

## 2021-06-29 VITALS — DIASTOLIC BLOOD PRESSURE: 61 MMHG | SYSTOLIC BLOOD PRESSURE: 110 MMHG

## 2021-06-29 VITALS — DIASTOLIC BLOOD PRESSURE: 66 MMHG | SYSTOLIC BLOOD PRESSURE: 111 MMHG

## 2021-06-29 VITALS — SYSTOLIC BLOOD PRESSURE: 107 MMHG | DIASTOLIC BLOOD PRESSURE: 53 MMHG

## 2021-06-29 VITALS — DIASTOLIC BLOOD PRESSURE: 59 MMHG | SYSTOLIC BLOOD PRESSURE: 100 MMHG

## 2021-06-29 VITALS — DIASTOLIC BLOOD PRESSURE: 58 MMHG | SYSTOLIC BLOOD PRESSURE: 125 MMHG

## 2021-06-29 VITALS — SYSTOLIC BLOOD PRESSURE: 129 MMHG | DIASTOLIC BLOOD PRESSURE: 83 MMHG

## 2021-06-29 VITALS — DIASTOLIC BLOOD PRESSURE: 58 MMHG | SYSTOLIC BLOOD PRESSURE: 129 MMHG

## 2021-06-29 VITALS — DIASTOLIC BLOOD PRESSURE: 64 MMHG | SYSTOLIC BLOOD PRESSURE: 123 MMHG

## 2021-06-29 VITALS — DIASTOLIC BLOOD PRESSURE: 64 MMHG | SYSTOLIC BLOOD PRESSURE: 117 MMHG

## 2021-06-29 VITALS — DIASTOLIC BLOOD PRESSURE: 67 MMHG | SYSTOLIC BLOOD PRESSURE: 121 MMHG

## 2021-06-29 VITALS — DIASTOLIC BLOOD PRESSURE: 54 MMHG | SYSTOLIC BLOOD PRESSURE: 102 MMHG

## 2021-06-29 VITALS — SYSTOLIC BLOOD PRESSURE: 126 MMHG | DIASTOLIC BLOOD PRESSURE: 66 MMHG

## 2021-06-29 VITALS — SYSTOLIC BLOOD PRESSURE: 123 MMHG | DIASTOLIC BLOOD PRESSURE: 66 MMHG

## 2021-06-29 VITALS — DIASTOLIC BLOOD PRESSURE: 68 MMHG | SYSTOLIC BLOOD PRESSURE: 110 MMHG

## 2021-06-29 VITALS — DIASTOLIC BLOOD PRESSURE: 73 MMHG | SYSTOLIC BLOOD PRESSURE: 133 MMHG

## 2021-06-29 VITALS — DIASTOLIC BLOOD PRESSURE: 56 MMHG | SYSTOLIC BLOOD PRESSURE: 100 MMHG

## 2021-06-29 VITALS — SYSTOLIC BLOOD PRESSURE: 129 MMHG | DIASTOLIC BLOOD PRESSURE: 67 MMHG

## 2021-06-29 VITALS — SYSTOLIC BLOOD PRESSURE: 118 MMHG | DIASTOLIC BLOOD PRESSURE: 63 MMHG

## 2021-06-29 VITALS — SYSTOLIC BLOOD PRESSURE: 134 MMHG | DIASTOLIC BLOOD PRESSURE: 59 MMHG

## 2021-06-29 VITALS — SYSTOLIC BLOOD PRESSURE: 127 MMHG | DIASTOLIC BLOOD PRESSURE: 62 MMHG

## 2021-06-29 VITALS — SYSTOLIC BLOOD PRESSURE: 119 MMHG | DIASTOLIC BLOOD PRESSURE: 57 MMHG

## 2021-06-29 VITALS — SYSTOLIC BLOOD PRESSURE: 141 MMHG | DIASTOLIC BLOOD PRESSURE: 82 MMHG

## 2021-06-29 VITALS — DIASTOLIC BLOOD PRESSURE: 62 MMHG | SYSTOLIC BLOOD PRESSURE: 110 MMHG

## 2021-06-29 VITALS — SYSTOLIC BLOOD PRESSURE: 121 MMHG | DIASTOLIC BLOOD PRESSURE: 63 MMHG

## 2021-06-29 VITALS — DIASTOLIC BLOOD PRESSURE: 57 MMHG | SYSTOLIC BLOOD PRESSURE: 116 MMHG

## 2021-06-29 VITALS — SYSTOLIC BLOOD PRESSURE: 113 MMHG | DIASTOLIC BLOOD PRESSURE: 59 MMHG

## 2021-06-29 VITALS — DIASTOLIC BLOOD PRESSURE: 65 MMHG | SYSTOLIC BLOOD PRESSURE: 127 MMHG

## 2021-06-29 VITALS — DIASTOLIC BLOOD PRESSURE: 81 MMHG | SYSTOLIC BLOOD PRESSURE: 149 MMHG

## 2021-06-29 VITALS — SYSTOLIC BLOOD PRESSURE: 108 MMHG | DIASTOLIC BLOOD PRESSURE: 54 MMHG

## 2021-06-29 RX ADMIN — ENOXAPARIN SODIUM SCH MG: 100 INJECTION SUBCUTANEOUS at 23:35

## 2021-06-29 RX ADMIN — ACETAMINOPHEN PRN MG: 500 TABLET ORAL at 04:06

## 2021-06-29 RX ADMIN — DOCUSATE SODIUM SCH MG: 100 CAPSULE ORAL at 22:18

## 2021-06-29 RX ADMIN — SODIUM CHLORIDE, SODIUM LACTATE, POTASSIUM CHLORIDE, CALCIUM CHLORIDE, AND DEXTROSE MONOHYDRATE SCH MLS/HR: 600; 310; 30; 20; 5 INJECTION, SOLUTION INTRAVENOUS at 07:51

## 2021-06-29 RX ADMIN — SODIUM CHLORIDE, SODIUM LACTATE, POTASSIUM CHLORIDE, CALCIUM CHLORIDE, AND DEXTROSE MONOHYDRATE SCH MLS/HR: 600; 310; 30; 20; 5 INJECTION, SOLUTION INTRAVENOUS at 00:15

## 2021-06-29 RX ADMIN — ACETAMINOPHEN SCH MG: 500 TABLET ORAL at 22:19

## 2021-06-29 RX ADMIN — SODIUM CHLORIDE, SODIUM LACTATE, POTASSIUM CHLORIDE, AND CALCIUM CHLORIDE SCH MLS/HR: 600; 310; 30; 20 INJECTION, SOLUTION INTRAVENOUS at 08:33

## 2021-06-29 RX ADMIN — ACETAMINOPHEN PRN MG: 500 TABLET ORAL at 07:01

## 2021-06-29 RX ADMIN — SODIUM CHLORIDE, SODIUM LACTATE, POTASSIUM CHLORIDE, AND CALCIUM CHLORIDE SCH MLS/HR: 600; 310; 30; 20 INJECTION, SOLUTION INTRAVENOUS at 00:15

## 2021-06-29 RX ADMIN — SODIUM CHLORIDE, SODIUM LACTATE, POTASSIUM CHLORIDE, CALCIUM CHLORIDE, AND DEXTROSE MONOHYDRATE SCH MLS/HR: 600; 310; 30; 20; 5 INJECTION, SOLUTION INTRAVENOUS at 15:31

## 2021-06-29 NOTE — CESAREAN SECTION OPERATIVE
Procedure


 Procedure Note


Pre-operative Diagnosis: Cuco Millard  is a 21  /Para  1 / 0,

Gestational Age 39 weeks, arrest of dilitation, non-reassuring fetal heart 

tracing


Post-operative Diagnosis: same, meconium, OP presentation, triple nuchal cord


Procedure: Primary low transverse  section


Physician: GURWINDER MUÑOZ 


Estimated blood loss:  600 mL


Disposition:  stable


cord pH: 7.22





Findings:


Viable male infant, Apgars 8/9, weight 7$4 ounces, intact placenta, 3vc, normal 

appearing uterus, tubes, and ovaries.  nuchal cord x 3 loose. long cord





Indications:Cuco Millard  is a  23  /Para  1 / 0,Gestational Age 39 

weeks, arrest of dilitation, non-reassuring fetal heart tracing





Procedure Details:


The patient was seen in pre-op and the procedure was discussed with the patient 

in full, including the risks, benefits, and alternatives. All questions were 

answered. The patient was taken to the operating room and a time out was 

performed, verifying patient and procedure.


After spinal anesthesia was placed by our anesthesia colleagues, the patient was

placed in the dorsal supine with leftward tilt for uterine displacement.~ Her 

abdomen was then prepped and draped in the typical sterile fashion. A 

Pfannenstiel skin incision was made using a scalpel and carried down through the

underlying fascia. The fascia was incised in the midline and tented up using 

Kocher clamps. On both the inferior and superior fascia side the rectus muscle 

was dissected off bluntly and sharply using Dai scissors. The peritoneum was 

identified and entered bluntly in the midline. This was then stretched laterally

using manual strength. After entering the abdominal cavity and confirming lack 

of intraperitoneal adhesions, an extra large Nico retractor was placed and the

lower uterine segment was visualized. A scalpel was utilized to make a low 

transverse uterine incision. The infant's head was grasped and brought to the 

level of the incision. particulate mecounium was found.  Fundal pressure was 

applied and infant was delivered without difficulty. Mouth and nares were 

suctioned with bulb suction. After the umbilical cord was clamped and cut, the 

infant was handed off to the pediatric staff. A sample of cord blood was then 

obtained.  Cord gasses were sent. 


The placenta was delivered intact via uterine massage. The uterus was cleared of

all clots and debris. The uterine incision was closed using 0 Vicryl in a 

running locked fashion. A second imbricated layer was placed using 0 Vicryl in a

running fashion as well. The bilateral tubes and ovaries appeared normal. The 

abdominal gutters were cleared of all clots and debris. A final check of the u

terine incision showed it to be hemostatic. The peritoneum was closed using 3-0 

Vicryl in a running fashion. The fascia was closed with 0 PDS in a running 

fashion. The subcutaneous space was hemostatic, and irrigated. The subcutaneous 

space was closed with 0 Plain in several single interrupted stitches. The skin 

was then closed using 4-0 Monocryl in a running subcuticular fashion. The skin 

edges were reapproximated together and were hemostatic. A pressure dressing was 

applied. All sponge, lap and needle counts were correct at the end of the 

procedure per nursing.





Vitals - Labs


Vital Signs - I&O





Vital Signs








  Date Time  Temp Pulse Resp B/P (MAP) Pulse Ox O2 Delivery O2 Flow Rate FiO2


 


21 16:00  83 20 118/80 (93)  Room Air  


 


21 15:45  83 20 118/80 (93)  Room Air  


 


21 15:30 36.0 87  127/57 (80)  Room Air  


 


21 15:15  57  118/63 (81)  Room Air  


 


21 15:00  57  118/69 (85)  Room Air  


 


21 14:45  61  126/66 (86)  Room Air  


 


21 14:30      Room Air  


 


21 14:15 36.4 61  125/64 (84)  Room Air  


 


21 14:00  60  109/56 (73)  Room Air  


 


21 13:45      Room Air  


 


21 13:30  61 16 114/53 (73)  Room Air  


 


21 13:15  61  110/61 (77)  Room Air  


 


21 13:00  66  99/54 (69)  Room Air  


 


21 12:45  61  101/56 (71)  Room Air  


 


21 12:30 36.9 76  100/59 (73)  Room Air  


 


21 12:15  70  103/62 (76)  Room Air  


 


21 12:00  68 16 100/56 (71)  Room Air  


 


21 11:45  67  102/54 (70)  Room Air  


 


21 11:30  56  107/53 (71)  Room Air  


 


21 11:15  61  116/58 (77)    


 


21 11:00  62 16 122/66 (84) 96 Room Air  


 


21 10:45  55  129/58 (81) 97   


 


21 10:36  73  113/59 (77)    


 


21 10:33  59 16 117/56 (76)    


 


21 10:30  61  119/57 (77) 99   


 


21 10:28  60  119/60 (79)    


 


21 10:26 36.5 68  134/59 (84) 97   


 


21 10:22  56  107/57 (74) 99   


 


21 10:20  67  108/58 (75)    


 


21 10:16  81  110/62 (78) 100 Room Air  


 


21 10:15 36.5 75  110/62 (78)    


 


21 10:13  76  129/67 (87) 98 Room Air  


 


21 10:06  68  127/65 (85)    


 


21 10:02  80  127/62 (83) 100   


 


21 10:00        


 


21 09:59  73  130/64 (86) 100   


 


21 09:55  77 16 130/64 (86) 100   


 


21 09:52  72  110/68 (82) 99   


 


21 09:45        


 


21 09:45  67  123/64 (83) 99 Room Air  


 


21 09:34  68  123/66 (85) 98   


 


21 09:32  76  131/74 (93) 98 Room Air  


 


21 09:30  69  132/70 (90) 100 Room Air  


 


21 09:15  67 16 131/80 (97) 100 Room Air  


 


21 09:00  80  121/80 (94) 100 Room Air  


 


21 08:30      Room Air  


 


21 08:00      Room Air  


 


21 07:36 36.0 77 16 136/82 (100) 100 Room Air  


 


21 07:30      Room Air  


 


21 07:00   18   Room Air  


 


21 06:00   18   Room Air  


 


21 05:00  61 18 117/64 (81)  Room Air  


 


21 04:00 36.3 98 18  100 Room Air  


 


21 04:00  72 18 111/66 (81)  Room Air  


 


21 03:00  80 18 116/63 (80)  Room Air  


 


21 02:00  83 18 132/76 (94)  Room Air  


 


21 01:00  85 18 129/83 (98)  Room Air  


 


21 00:00      Room Air  


 


21 23:00 36.3 98 18 113/40 (64) 100 Room Air  














I & O 


 


 21





 07:00


 


Intake Total 1000 ml


 


Balance 1000 ml











Labs


Laboratory Tests


21 20:30: 


Urine Color YELLOW, Urine Clarity CLEAR, Urine pH 6.0, Urine Specific Gravity 

1.025H, Urine Protein 2+H, Urine Glucose (UA) NEGATIVE, Urine Ketones 1+H, Urine

Nitrite NEGATIVE, Urine Bilirubin 1+H, Urine Urobilinogen 2.0, Urine Leukocyte 

Esterase NEGATIVE, Urine RBC (Auto) NEGATIVE, Urine RBC NONE, Urine WBC 0-2, 

Urine Crystals PRESENTH, Urine Calcium Oxalate Crystals FEWH, Urine Bacteria 

LARGEH, Urine Casts NONE, Urine Mucus SMALLH, Urine Culture Indicated YES


21 23:22: 


White Blood Count 14.3H, Red Blood Count 4.09, Hemoglobin 12.3, Hematocrit 37, 

Mean Corpuscular Volume 90, Mean Corpuscular Hemoglobin 30, Mean Corpuscular 

Hemoglobin Concent 34, Red Cell Distribution Width 13.9, Platelet Count 235, 

Mean Platelet Volume 9.8, Immature Granulocyte % (Auto) 0, Neutrophils (%) 

(Auto) 80H, Lymphocytes (%) (Auto) 12, Monocytes (%) (Auto) 6, Eosinophils (%) 

(Auto) 1, Basophils (%) (Auto) 0, Neutrophils # (Auto) 11.5H, Lymphocytes # 

(Auto) 1.7, Monocytes # (Auto) 0.8, Eosinophils # (Auto) 0.1, Basophils # (Auto)

0.1, Immature Granulocyte # (Auto) 0.1





Microbiology


21 Urine Culture - Preliminary, Resulted


          Slight Growth Present











GURWINDER MUÑOZ DO               2021 19:55

## 2021-06-29 NOTE — OB TRIAGE REPORT
Standard Progress Note


Progress Notes/Assess & Plan


Date Seen by a Provider:  2021


Time Seen by a Provider:  17:10


Expected Date of Delivery:  2021


Gestational Age in Weeks:  39


Gestational Age in Days:  0


LMP/GIOVANNY Comment:  


21 IS edc


Progress/Assessment & Plan


When received epidural had intrathecal placement.  This caused some decrease in 

BP and variable decelerations.  This resolved without problem.  


Pitoscin was started for augmentation.


However, despite adequate contractions (IUPC) and pitocin, cervix is still 3 cm 

dilated.


Might consider continuing, however, there continue to be variable decelerations 

and there is little fluid


delivery is not imminent.





Will plan for  section due to arrest of dilitation/non reassuring fetal 

hearttones, remote from delivery. 





Proper consents obtained.


Risks of bleeding, infection, injury to bowel, bladder and ureter have been 

explained.


Will use Ancef prophylactically and azithromycin as there are ruptured 

membranes. 


Also prophylactice SCDs and Lovenox post operatively for DVT prophylaxis.











GURWINDER MUÑOZ DO               2021 17:17

## 2021-06-29 NOTE — OB TRIAGE REPORT
Standard Progress Note


Progress Notes/Assess & Plan


Date Seen by a Provider:  Jun 29, 2021


Time Seen by a Provider:  08:00


Expected Date of Delivery:  Jul 6, 2021


Gestational Age in Weeks:  39


Gestational Age in Days:  0


LMP/GIOVANNY Comment:  


7/6/21 IS edc


Progress/Assessment & Plan


Misoprostol x 1 last night.  Difficult to trace due to body habitus and 

repositioning.


Had decreased variability and some subtle decelerations (only after getting up 

to bathroom) but overall tracing was reassuring.  Again, quite difficult to 

trace due to habitus


2nd miso was held due to this.  Then she had SROM with clear fluid.


Complains of back pain in a regular pattern.  Unable to trace adequately but she

says they are every few minutes.  Tylenol was given for back pain and she 

promptly threw it up and said she felt better.


She was then given Zofran and repeated the tylenol about 4 hours later (she 

vomited the pills)





/82





SVE


1/90/-3


clear fluid


Contractions every 3-4 minutes and moderate


-150.  FSE placed and variability is easier to .  Some accels 

noted.  no decels currently





Will had epidural placed and then augment as needed with pitocin.


Final Diagnosis


1.  39 week gestation


2.  Induction of labor


3.  Morbid obesity











GURWINDER MUÑOZ DO               Jun 29, 2021 08:49

## 2021-06-30 VITALS — DIASTOLIC BLOOD PRESSURE: 61 MMHG | SYSTOLIC BLOOD PRESSURE: 130 MMHG

## 2021-06-30 VITALS — DIASTOLIC BLOOD PRESSURE: 68 MMHG | SYSTOLIC BLOOD PRESSURE: 126 MMHG

## 2021-06-30 VITALS — DIASTOLIC BLOOD PRESSURE: 57 MMHG | SYSTOLIC BLOOD PRESSURE: 125 MMHG

## 2021-06-30 VITALS — DIASTOLIC BLOOD PRESSURE: 73 MMHG | SYSTOLIC BLOOD PRESSURE: 108 MMHG

## 2021-06-30 VITALS — DIASTOLIC BLOOD PRESSURE: 63 MMHG | SYSTOLIC BLOOD PRESSURE: 129 MMHG

## 2021-06-30 LAB
BASOPHILS # BLD AUTO: 0 10^3/UL (ref 0–0.1)
BASOPHILS NFR BLD AUTO: 0 % (ref 0–10)
EOSINOPHIL # BLD AUTO: 0.1 10^3/UL (ref 0–0.3)
EOSINOPHIL NFR BLD AUTO: 0 % (ref 0–10)
HCT VFR BLD CALC: 28 % (ref 35–52)
HGB BLD-MCNC: 9.4 G/DL (ref 11.5–16)
LYMPHOCYTES # BLD AUTO: 1.8 10^3/UL (ref 1–4)
LYMPHOCYTES NFR BLD AUTO: 11 % (ref 12–44)
MANUAL DIFFERENTIAL PERFORMED BLD QL: NO
MCH RBC QN AUTO: 30 PG (ref 25–34)
MCHC RBC AUTO-ENTMCNC: 34 G/DL (ref 32–36)
MCV RBC AUTO: 90 FL (ref 80–99)
MONOCYTES # BLD AUTO: 1.1 10^3/UL (ref 0–1)
MONOCYTES NFR BLD AUTO: 6 % (ref 0–12)
NEUTROPHILS # BLD AUTO: 14.5 10^3/UL (ref 1.8–7.8)
NEUTROPHILS NFR BLD AUTO: 82 % (ref 42–75)
PLATELET # BLD: 171 10^3/UL (ref 130–400)
PMV BLD AUTO: 9.8 FL (ref 9–12.2)
WBC # BLD AUTO: 17.5 10^3/UL (ref 4.3–11)

## 2021-06-30 RX ADMIN — ACETAMINOPHEN PRN MG: 500 TABLET ORAL at 14:03

## 2021-06-30 RX ADMIN — KETOROLAC TROMETHAMINE SCH MG: 30 INJECTION, SOLUTION INTRAMUSCULAR; INTRAVENOUS at 08:40

## 2021-06-30 RX ADMIN — ACETAMINOPHEN SCH MG: 500 TABLET ORAL at 06:18

## 2021-06-30 RX ADMIN — KETOROLAC TROMETHAMINE SCH MG: 30 INJECTION, SOLUTION INTRAMUSCULAR; INTRAVENOUS at 03:09

## 2021-06-30 RX ADMIN — DOCUSATE SODIUM SCH MG: 100 CAPSULE ORAL at 08:41

## 2021-06-30 RX ADMIN — KETOROLAC TROMETHAMINE SCH MG: 30 INJECTION, SOLUTION INTRAMUSCULAR; INTRAVENOUS at 15:01

## 2021-06-30 RX ADMIN — IBUPROFEN SCH MG: 600 TABLET ORAL at 15:31

## 2021-06-30 RX ADMIN — ACETAMINOPHEN SCH MG: 500 TABLET ORAL at 22:31

## 2021-06-30 RX ADMIN — FERROUS SULFATE TAB 325 MG (65 MG ELEMENTAL FE) SCH MG: 325 (65 FE) TAB at 08:41

## 2021-06-30 RX ADMIN — IBUPROFEN SCH MG: 600 TABLET ORAL at 20:56

## 2021-06-30 RX ADMIN — DOCUSATE SODIUM SCH MG: 100 CAPSULE ORAL at 20:56

## 2021-06-30 RX ADMIN — ENOXAPARIN SODIUM SCH MG: 100 INJECTION SUBCUTANEOUS at 22:31

## 2021-06-30 NOTE — ANESTHESIA-REGIONAL POST-OP
Regional


Patient Condition


Mental Status:  Alert, Oriented x3


Circulation:  Same as Pre-Op


Headache:  Absent


Sensation:  Full Recovery


Motor Block:  Absent





Post Op Complications


Complications


None





Follow Up Care/Instructions


Patient Instructions


None needed.





Anesthesia/Patient Condition


Patient is doing well, no complaints, stable vital signs, no apparent adverse 

anesthesia problems.   


No complications reported per nursing.











JARED MUNROE CRNA         Jun 30, 2021 10:48

## 2021-06-30 NOTE — POSTPARTUM PROGRESS NOTE
Post Op


Post-operative Day #1 s/p PLTCS; arrest of dilitation, NRFHT, OP presentation





Subjective:


Patient is without complaints. Ambulating, voiding after crain removed. 

Tolerating a regular diet without nausea or vomiting. Normal lochia. Pain is 

well controlled with oral pain medications. Passing flatus.  [] feeding. []





Objective:











 21





 22:16 02:17 06:17


 


Temp 36.6 36.6 36.3


 


Pulse 84 81 75


 


Resp 18 18 18


 


B/P (MAP) 125/58 (80) 126/68 (87) 125/57 (79)


 


Pulse Ox 96 99 98


 


O2 Delivery Room Air Room Air Room Air














 21





 00:00


 


Intake Total 2400 ml


 


Output Total 150 ml


 


Balance 2250 ml








Laboratory Tests








Test


 21


05:45 Range/Units


 


 


White Blood Count


 17.5 H


 4.3-11.0


10^3/uL


 


Red Blood Count


 3.11 L


 3.80-5.11


10^6/uL


 


Hemoglobin 9.4 #L 11.5-16.0  g/dL


 


Hematocrit 28 L 35-52  %


 


Mean Corpuscular Volume 90  80-99  fL


 


Mean Corpuscular Hemoglobin 30  25-34  pg


 


Mean Corpuscular Hemoglobin


Concent 34 


 32-36  g/dL





 


Red Cell Distribution Width 13.9  10.0-14.5  %


 


Platelet Count


 171 


 130-400


10^3/uL


 


Mean Platelet Volume 9.8  9.0-12.2  fL


 


Immature Granulocyte % (Auto) 1   %


 


Neutrophils (%) (Auto) 82 H 42-75  %


 


Lymphocytes (%) (Auto) 11 L 12-44  %


 


Monocytes (%) (Auto) 6  0-12  %


 


Eosinophils (%) (Auto) 0  0-10  %


 


Basophils (%) (Auto) 0  0-10  %


 


Neutrophils # (Auto)


 14.5 H


 1.8-7.8


10^3/uL


 


Lymphocytes # (Auto)


 1.8 


 1.0-4.0


10^3/uL


 


Monocytes # (Auto)


 1.1 H


 0.0-1.0


10^3/uL


 


Eosinophils # (Auto)


 0.1 


 0.0-0.3


10^3/uL


 


Basophils # (Auto)


 0.0 


 0.0-0.1


10^3/uL


 


Immature Granulocyte # (Auto)


 0.1 


 0.0-0.1


10^3/uL











Physical Exam:


General - Alert and oriented, no apparent distress


Abdomen - Soft, appropriately tender to palpation, non-distended, fundus firm at

 umbilicus


Incision - clean, dry and intact; no erythema or induration, no drainage


Extremities - no edema, negative Libertad's bilaterally








Assessment:


1. post-operative day # 1, status post PLTCS.


Recovering well, hemodynamically stable


2.  Antepartum and Acute blood loss anemia








Plan:


Routine post-operative care.


Encourage breast feeding.


Encourage ambulation.


VTE prophylaxis:  SCDs.


Ferrous sulfate supplementation.


Plan for discharge tomorrow





Vitals - Labs


Vital Signs - I&O





Vital Signs








  Date Time  Temp Pulse Resp B/P (MAP) Pulse Ox O2 Delivery O2 Flow Rate FiO2


 


21 06:17 36.3 75 18 125/57 (79) 98 Room Air  


 


21 02:17 36.6 81 18 126/68 (87) 99 Room Air  


 


21 22:16 36.6 84 18 125/58 (80) 96 Room Air  


 


21 20:30      Room Air  


 


21 20:30 36.5  18 108/54 (72) 99 Room Air  


 


21 20:15 36.5  18 116/57 (76) 100 Room Air  


 


21 20:15      Room Air  


 


21 20:00      Room Air  


 


21 20:00 36.4  18 121/63 (82) 100 Room Air  


 


21 19:45 36.5  18 106/73 (84) 98 Room Air  


 


21 19:45      Room Air  


 


21 18:00 36.2 103  149/81 (103) 100 Room Air  


 


21 17:45  104  139/78 (98) 100 Room Air  


 


21 17:30  88 20 133/73 (93) 96 Room Air  


 


21 17:15 36.7 103  141/82 (101) 99 Room Air  


 


21 17:00      Room Air  


 


21 16:45  69  135/88 (104)  Room Air  


 


21 16:30  55  145/71 (95)  Room Air  


 


21 16:15  57  121/67 (85)  Room Air  


 


21 16:00  83 20 118/80 (93)  Room Air  


 


21 15:45  83 20 118/80 (93)  Room Air  


 


21 15:30 36.0 87  127/57 (80)  Room Air  


 


21 15:15  57  118/63 (81)  Room Air  


 


21 15:00  57  118/69 (85)  Room Air  


 


21 14:45  61  126/66 (86)  Room Air  


 


21 14:30      Room Air  


 


21 14:15 36.4 61  125/64 (84)  Room Air  


 


21 14:00  60  109/56 (73)  Room Air  


 


21 13:45      Room Air  


 


21 13:30  61 16 114/53 (73)  Room Air  


 


21 13:15  61  110/61 (77)  Room Air  


 


21 13:00  66  99/54 (69)  Room Air  


 


21 12:45  61  101/56 (71)  Room Air  


 


21 12:30 36.9 76  100/59 (73)  Room Air  


 


21 12:15  70  103/62 (76)  Room Air  


 


21 12:00  68 16 100/56 (71)  Room Air  


 


21 11:45  67  102/54 (70)  Room Air  


 


21 11:30  56  107/53 (71)  Room Air  


 


21 11:15  61  116/58 (77)    


 


21 11:00  62 16 122/66 (84) 96 Room Air  


 


21 10:45  55  129/58 (81) 97   


 


21 10:36  73  113/59 (77)    


 


21 10:33  59 16 117/56 (76)    


 


21 10:30  61  119/57 (77) 99   


 


21 10:28  60  119/60 (79)    


 


21 10:26 36.5 68  134/59 (84) 97   


 


21 10:22  56  107/57 (74) 99   


 


21 10:20  67  108/58 (75)    


 


21 10:16  81  110/62 (78) 100 Room Air  


 


21 10:15 36.5 75  110/62 (78)    


 


21 10:13  76  129/67 (87) 98 Room Air  


 


21 10:06  68  127/65 (85)    


 


21 10:02  80  127/62 (83) 100   


 


21 10:00        


 


21 09:59  73  130/64 (86) 100   


 


21 09:55  77 16 130/64 (86) 100   


 


21 09:52  72  110/68 (82) 99   


 


21 09:45        


 


21 09:45  67  123/64 (83) 99 Room Air  


 


21 09:34  68  123/66 (85) 98   


 


21 09:32  76  131/74 (93) 98 Room Air  


 


21 09:30  69  132/70 (90) 100 Room Air  


 


21 09:15  67 16 131/80 (97) 100 Room Air  


 


21 09:00  80  121/80 (94) 100 Room Air  














I & O 


 


 21





 07:00


 


Intake Total 4900 ml


 


Output Total 600 ml


 


Balance 4300 ml











Labs


Laboratory Tests


21 05:45: 


White Blood Count 17.5H, Red Blood Count 3.11L, Hemoglobin 9.4#L, Hematocrit 28L

, Mean Corpuscular Volume 90, Mean Corpuscular Hemoglobin 30, Mean Corpuscular 

Hemoglobin Concent 34, Red Cell Distribution Width 13.9, Platelet Count 171, 

Mean Platelet Volume 9.8, Immature Granulocyte % (Auto) 1, Neutrophils (%) 

(Auto) 82H, Lymphocytes (%) (Auto) 11L, Monocytes (%) (Auto) 6, Eosinophils (%) 

(Auto) 0, Basophils (%) (Auto) 0, Neutrophils # (Auto) 14.5H, Lymphocytes # 

(Auto) 1.8, Monocytes # (Auto) 1.1H, Eosinophils # (Auto) 0.1, Basophils # 

(Auto) 0.0, Immature Granulocyte # (Auto) 0.1





Microbiology


21 Urine Culture - Preliminary, Resulted


          Slight Growth Present











GURWINDER MUÑOZ DO               2021 08:41

## 2021-07-01 VITALS — SYSTOLIC BLOOD PRESSURE: 128 MMHG | DIASTOLIC BLOOD PRESSURE: 57 MMHG

## 2021-07-01 VITALS — SYSTOLIC BLOOD PRESSURE: 124 MMHG | DIASTOLIC BLOOD PRESSURE: 56 MMHG

## 2021-07-01 RX ADMIN — ACETAMINOPHEN SCH MG: 500 TABLET ORAL at 06:04

## 2021-07-01 RX ADMIN — IBUPROFEN SCH MG: 600 TABLET ORAL at 03:38

## 2021-07-01 RX ADMIN — DOCUSATE SODIUM SCH MG: 100 CAPSULE ORAL at 08:19

## 2021-07-01 RX ADMIN — IBUPROFEN SCH MG: 600 TABLET ORAL at 08:19

## 2021-07-01 RX ADMIN — FERROUS SULFATE TAB 325 MG (65 MG ELEMENTAL FE) SCH MG: 325 (65 FE) TAB at 08:19

## 2021-07-01 NOTE — DISCHARGE SUMMARY
Discharge Summary


Hospital Course


Problems Reviewed?:  Yes


Hospital Course


Date of Admission: Jun 28, 2021 at 20:21 


Admission Diagnosis :  





Family Physician/Provider: Gurwinder Muñoz DO  





Date of Discharge: 7/1/21 


Discharge Diagnosis: [ ]








Hospital Course:


[ ]














Labs and Pending Lab Test:





Microbiology


6/28/21 Urine Culture - Preliminary, Resulted


          Slight Growth Present





Home Meds


Active


Cephalexin 500 Mg Tablet 500 Mg PO QID 7 Days


Reported


Prenatal Gummies ([id number]/FA/Omega3/Dha/Fish Oil) 1 Each Tab.chew 1 Each PO DAILY


Activity:  Activity as Tolerated


Driving Instructions:  No Driving for 1 Week


NO SMOKING:  NO SMOKING


Nothing Inside Vagina:  No Douching, No [place], No Tampons


Discharge Diet:  No Restrictions


Symptoms to Report to :  Swelling Increased, Bleeding Excessive, Pain 

Increased, Fever Over 101 Degrees F, Vaginal Bleeding Increase, Cramps in Feet 

or Legs, Vaginal Discharge Foul


For Any Problems or Questions:  Contact Your Physician


Infection Signs and Symptoms:  Increased Redness, Foul Odor of Wound, Increased 

Drainage, Skin Itchy or Has a Rash, Increased Swelling, Temperature Above 101  F


Operative Area Clean and Dry:  Keep Incision Clean/Dry


Stitches/Staples/Dermabond:  Dermabond


Bathing Instructions:  Shower





Discharge Physical Examination


Allergies:  


Coded Allergies:  


     strawberry (Unverified  Allergy, Mild, 6/26/21)


     latex (Verified  Allergy, Unknown, RASH, 6/26/21)


Vitals & I&Os





Vital Signs








  Date Time  Temp Pulse Resp B/P (MAP) Pulse Ox O2 Delivery O2 Flow Rate FiO2


 


7/1/21 03:35 36.7 92 16 124/56 (78) 100 Room Air  








General Appearance:  No Apparent Distress


Cardiovascular:  Regular Rate, Rhythm


Gastrointestinal:  Normal Bowel Sounds, Other (INc c/d/i)


Extremity:  No Calf Tenderness, No Pedal Edema





Discharge Summary


Date of Admission


Jun 28, 2021 at 20:21


Date of Discharge














GURWINDER MUÑOZ DO                Jul 1, 2021 09:13

## 2021-12-21 ENCOUNTER — HOSPITAL ENCOUNTER (OUTPATIENT)
Dept: HOSPITAL 75 - RAD FS | Age: 21
End: 2021-12-21
Attending: REGISTERED NURSE
Payer: MEDICAID

## 2021-12-21 DIAGNOSIS — G43.909: Primary | ICD-10-CM

## 2021-12-21 PROCEDURE — 70450 CT HEAD/BRAIN W/O DYE: CPT

## 2021-12-21 NOTE — DIAGNOSTIC IMAGING REPORT
CLINICAL INDICATION: Patient with migraine.



EXAM: Axial CT scan of the brain without IV contrast with coronal

and sagittal reformatted images. Auto Exposure Controls were

utilized during the CT exam to meet ALARA standards for radiation

dose reduction.



COMPARISON: None.



FINDINGS:

There is no evidence of acute cerebral infarct, intracranial

hemorrhage, or gross mass effect. 



The brain parenchymal volume appears appropriate for patient's

age. There is normal gray-white matter distinction.  There is no

significant midline shift or herniation. 



There is no evidence of hydrocephalus. The basal cisterns are

unremarkable. The skull, extracranial soft tissue, and orbits are

unremarkable. The paranasal sinuses are unremarkable. Temporal

bones show no significant abnormality.



IMPRESSION:

Unremarkable CT scan of the brain.



Dictated by: 



  Dictated on workstation # [id number]

## 2023-11-16 ENCOUNTER — HOSPITAL ENCOUNTER (EMERGENCY)
Dept: HOSPITAL 75 - ER FS | Age: 23
LOS: 1 days | Discharge: HOME | End: 2023-11-17
Payer: COMMERCIAL

## 2023-11-16 VITALS — HEIGHT: 66.02 IN | BODY MASS INDEX: 47.09 KG/M2 | WEIGHT: 293 LBS

## 2023-11-16 DIAGNOSIS — M62.830: Primary | ICD-10-CM

## 2023-11-16 DIAGNOSIS — Z91.040: ICD-10-CM
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
## 2023-11-16 DIAGNOSIS — E66.9: ICD-10-CM

## 2023-11-16 LAB
ALBUMIN SERPL-MCNC: 4 GM/DL (ref 3.2–4.5)
ALP SERPL-CCNC: 59 U/L (ref 40–136)
ALT SERPL-CCNC: 15 U/L (ref 0–55)
AMORPH SED URNS QL MICRO: (no result) /LPF
APTT PPP: YELLOW S
BACTERIA #/AREA URNS HPF: (no result) /HPF
BARBITURATES UR QL: NEGATIVE
BASOPHILS # BLD AUTO: 0.1 10^3/UL (ref 0–0.1)
BASOPHILS NFR BLD AUTO: 1 % (ref 0–10)
BILIRUB SERPL-MCNC: 1.3 MG/DL (ref 0.1–1)
BILIRUB UR QL STRIP: NEGATIVE
BUN/CREAT SERPL: 18
CALCIUM SERPL-MCNC: 8.8 MG/DL (ref 8.5–10.1)
CHLORIDE SERPL-SCNC: 107 MMOL/L (ref 98–107)
CO2 SERPL-SCNC: 24 MMOL/L (ref 21–32)
COCAINE UR QL: NEGATIVE
CREAT SERPL-MCNC: 0.49 MG/DL (ref 0.6–1.3)
EOSINOPHIL # BLD AUTO: 0.2 10^3/UL (ref 0–0.3)
EOSINOPHIL NFR BLD AUTO: 4 % (ref 0–10)
FIBRINOGEN PPP-MCNC: (no result) MG/DL
GFR SERPLBLD BASED ON 1.73 SQ M-ARVRAT: 134 ML/MIN
GLUCOSE SERPL-MCNC: 101 MG/DL (ref 70–105)
GLUCOSE UR STRIP-MCNC: NEGATIVE MG/DL
HCT VFR BLD CALC: 39 % (ref 35–52)
HGB BLD-MCNC: 13.1 G/DL (ref 11.5–16)
HYALINE CASTS #/AREA URNS LPF: (no result) /LPF
KETONES UR QL STRIP: NEGATIVE
LEUKOCYTE ESTERASE UR QL STRIP: NEGATIVE
LYMPHOCYTES # BLD AUTO: 1.8 10^3/UL (ref 1–4)
LYMPHOCYTES NFR BLD AUTO: 27 % (ref 12–44)
MANUAL DIFFERENTIAL PERFORMED BLD QL: NO
MCH RBC QN AUTO: 30 PG (ref 25–34)
MCHC RBC AUTO-ENTMCNC: 34 G/DL (ref 32–36)
MCV RBC AUTO: 87 FL (ref 80–99)
METHADONE UR QL SCN: NEGATIVE
MONOCYTES # BLD AUTO: 0.5 10^3/UL (ref 0–1)
MONOCYTES NFR BLD AUTO: 8 % (ref 0–12)
NEUTROPHILS # BLD AUTO: 4.1 10^3/UL (ref 1.8–7.8)
NEUTROPHILS NFR BLD AUTO: 62 % (ref 42–75)
NITRITE UR QL STRIP: NEGATIVE
OPIATES UR QL SCN: NEGATIVE
OXYCODONE UR QL: NEGATIVE
PH UR STRIP: 6 [PH] (ref 5–9)
PLATELET # BLD: 228 10^3/UL (ref 130–400)
PMV BLD AUTO: 9.1 FL (ref 9–12.2)
POTASSIUM SERPL-SCNC: 3.7 MMOL/L (ref 3.6–5)
PROT SERPL-MCNC: 7 GM/DL (ref 6.4–8.2)
PROT UR QL STRIP: (no result)
RBC #/AREA URNS HPF: (no result) /HPF
SODIUM SERPL-SCNC: 141 MMOL/L (ref 135–145)
SP GR UR STRIP: >=1.03 (ref 1.02–1.02)
SQUAMOUS #/AREA URNS HPF: (no result) /HPF
TRICYCLICS UR QL SCN: NEGATIVE
WBC # BLD AUTO: 6.7 10^3/UL (ref 4.3–11)
WBC #/AREA URNS HPF: (no result) /HPF

## 2023-11-16 PROCEDURE — 70450 CT HEAD/BRAIN W/O DYE: CPT

## 2023-11-16 PROCEDURE — 36415 COLL VENOUS BLD VENIPUNCTURE: CPT

## 2023-11-16 PROCEDURE — 81000 URINALYSIS NONAUTO W/SCOPE: CPT

## 2023-11-16 PROCEDURE — 80320 DRUG SCREEN QUANTALCOHOLS: CPT

## 2023-11-16 PROCEDURE — 85025 COMPLETE CBC W/AUTO DIFF WBC: CPT

## 2023-11-16 PROCEDURE — 80053 COMPREHEN METABOLIC PANEL: CPT

## 2023-11-16 PROCEDURE — 72125 CT NECK SPINE W/O DYE: CPT

## 2023-11-16 PROCEDURE — 72128 CT CHEST SPINE W/O DYE: CPT

## 2023-11-16 PROCEDURE — 99284 EMERGENCY DEPT VISIT MOD MDM: CPT

## 2023-11-16 PROCEDURE — 72131 CT LUMBAR SPINE W/O DYE: CPT

## 2023-11-16 PROCEDURE — 80306 DRUG TEST PRSMV INSTRMNT: CPT
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
## 2023-11-17 VITALS — DIASTOLIC BLOOD PRESSURE: 74 MMHG | SYSTOLIC BLOOD PRESSURE: 126 MMHG
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
